# Patient Record
Sex: FEMALE | Race: WHITE | Employment: OTHER | URBAN - METROPOLITAN AREA
[De-identification: names, ages, dates, MRNs, and addresses within clinical notes are randomized per-mention and may not be internally consistent; named-entity substitution may affect disease eponyms.]

---

## 2018-02-26 ENCOUNTER — TELEPHONE (OUTPATIENT)
Dept: INTERNAL MEDICINE CLINIC | Age: 68
End: 2018-02-26

## 2018-04-02 ENCOUNTER — OFFICE VISIT (OUTPATIENT)
Dept: INTERNAL MEDICINE CLINIC | Age: 68
End: 2018-04-02

## 2018-04-02 VITALS
DIASTOLIC BLOOD PRESSURE: 94 MMHG | HEART RATE: 80 BPM | HEIGHT: 67 IN | BODY MASS INDEX: 35.69 KG/M2 | SYSTOLIC BLOOD PRESSURE: 144 MMHG | WEIGHT: 227.4 LBS

## 2018-04-02 DIAGNOSIS — C91.10 CLL (CHRONIC LYMPHOCYTIC LEUKEMIA) (HCC): Primary | ICD-10-CM

## 2018-04-02 DIAGNOSIS — Z13.220 SCREENING CHOLESTEROL LEVEL: ICD-10-CM

## 2018-04-02 DIAGNOSIS — R63.5 WEIGHT GAIN: ICD-10-CM

## 2018-04-02 DIAGNOSIS — E66.9 OBESITY (BMI 35.0-39.9 WITHOUT COMORBIDITY): ICD-10-CM

## 2018-04-02 DIAGNOSIS — E55.9 VITAMIN D INSUFFICIENCY: ICD-10-CM

## 2018-04-02 DIAGNOSIS — I10 ESSENTIAL HYPERTENSION: ICD-10-CM

## 2018-04-02 PROCEDURE — 99214 OFFICE O/P EST MOD 30 MIN: CPT | Performed by: INTERNAL MEDICINE

## 2018-04-02 RX ORDER — AMLODIPINE BESYLATE 5 MG/1
5 TABLET ORAL DAILY
Qty: 30 TABLET | Refills: 3 | Status: SHIPPED | OUTPATIENT
Start: 2018-04-02 | End: 2019-02-24 | Stop reason: SDUPTHER

## 2018-04-02 ASSESSMENT — ENCOUNTER SYMPTOMS
CONSTIPATION: 0
DIARRHEA: 0
CHEST TIGHTNESS: 0
SHORTNESS OF BREATH: 0

## 2018-10-02 ENCOUNTER — OFFICE VISIT (OUTPATIENT)
Dept: INTERNAL MEDICINE CLINIC | Age: 68
End: 2018-10-02
Payer: MEDICARE

## 2018-10-02 VITALS
DIASTOLIC BLOOD PRESSURE: 78 MMHG | RESPIRATION RATE: 12 BRPM | SYSTOLIC BLOOD PRESSURE: 120 MMHG | HEART RATE: 84 BPM | BODY MASS INDEX: 36.09 KG/M2 | WEIGHT: 230.4 LBS

## 2018-10-02 DIAGNOSIS — Z71.85 VACCINE COUNSELING: ICD-10-CM

## 2018-10-02 DIAGNOSIS — E55.9 VITAMIN D INSUFFICIENCY: ICD-10-CM

## 2018-10-02 DIAGNOSIS — C91.10 CLL (CHRONIC LYMPHOCYTIC LEUKEMIA) (HCC): ICD-10-CM

## 2018-10-02 DIAGNOSIS — Z13.220 SCREENING CHOLESTEROL LEVEL: ICD-10-CM

## 2018-10-02 DIAGNOSIS — H61.21 IMPACTED CERUMEN, RIGHT EAR: ICD-10-CM

## 2018-10-02 DIAGNOSIS — Z12.39 BREAST CANCER SCREENING: ICD-10-CM

## 2018-10-02 DIAGNOSIS — I10 ESSENTIAL HYPERTENSION: Primary | ICD-10-CM

## 2018-10-02 DIAGNOSIS — Z12.11 COLON CANCER SCREENING: ICD-10-CM

## 2018-10-02 DIAGNOSIS — R63.5 WEIGHT GAIN: ICD-10-CM

## 2018-10-02 DIAGNOSIS — I10 ESSENTIAL HYPERTENSION: ICD-10-CM

## 2018-10-02 LAB
CHOLESTEROL, TOTAL: 319 MG/DL (ref 0–199)
HDLC SERPL-MCNC: 65 MG/DL (ref 40–60)
LDL CHOLESTEROL CALCULATED: 209 MG/DL
TRIGL SERPL-MCNC: 226 MG/DL (ref 0–150)
TSH REFLEX: 1.87 UIU/ML (ref 0.27–4.2)
VITAMIN D 25-HYDROXY: 15.4 NG/ML
VLDLC SERPL CALC-MCNC: 45 MG/DL

## 2018-10-02 PROCEDURE — G8427 DOCREV CUR MEDS BY ELIG CLIN: HCPCS | Performed by: INTERNAL MEDICINE

## 2018-10-02 PROCEDURE — 1123F ACP DISCUSS/DSCN MKR DOCD: CPT | Performed by: INTERNAL MEDICINE

## 2018-10-02 PROCEDURE — 1101F PT FALLS ASSESS-DOCD LE1/YR: CPT | Performed by: INTERNAL MEDICINE

## 2018-10-02 PROCEDURE — 1090F PRES/ABSN URINE INCON ASSESS: CPT | Performed by: INTERNAL MEDICINE

## 2018-10-02 PROCEDURE — 1036F TOBACCO NON-USER: CPT | Performed by: INTERNAL MEDICINE

## 2018-10-02 PROCEDURE — G8417 CALC BMI ABV UP PARAM F/U: HCPCS | Performed by: INTERNAL MEDICINE

## 2018-10-02 PROCEDURE — G8484 FLU IMMUNIZE NO ADMIN: HCPCS | Performed by: INTERNAL MEDICINE

## 2018-10-02 PROCEDURE — 99214 OFFICE O/P EST MOD 30 MIN: CPT | Performed by: INTERNAL MEDICINE

## 2018-10-02 PROCEDURE — G8400 PT W/DXA NO RESULTS DOC: HCPCS | Performed by: INTERNAL MEDICINE

## 2018-10-02 PROCEDURE — 4040F PNEUMOC VAC/ADMIN/RCVD: CPT | Performed by: INTERNAL MEDICINE

## 2018-10-02 PROCEDURE — 3017F COLORECTAL CA SCREEN DOC REV: CPT | Performed by: INTERNAL MEDICINE

## 2018-10-02 ASSESSMENT — ENCOUNTER SYMPTOMS
DIARRHEA: 0
CHEST TIGHTNESS: 0
SHORTNESS OF BREATH: 0
CONSTIPATION: 0

## 2018-10-02 ASSESSMENT — PATIENT HEALTH QUESTIONNAIRE - PHQ9
SUM OF ALL RESPONSES TO PHQ QUESTIONS 1-9: 0
2. FEELING DOWN, DEPRESSED OR HOPELESS: 0
1. LITTLE INTEREST OR PLEASURE IN DOING THINGS: 0
SUM OF ALL RESPONSES TO PHQ9 QUESTIONS 1 & 2: 0
SUM OF ALL RESPONSES TO PHQ QUESTIONS 1-9: 0

## 2018-10-22 ENCOUNTER — OFFICE VISIT (OUTPATIENT)
Dept: INTERNAL MEDICINE CLINIC | Age: 68
End: 2018-10-22
Payer: MEDICARE

## 2018-10-22 VITALS
WEIGHT: 228.8 LBS | HEART RATE: 78 BPM | DIASTOLIC BLOOD PRESSURE: 80 MMHG | SYSTOLIC BLOOD PRESSURE: 124 MMHG | RESPIRATION RATE: 12 BRPM | BODY MASS INDEX: 35.84 KG/M2 | TEMPERATURE: 98.6 F

## 2018-10-22 DIAGNOSIS — J30.2 SEASONAL ALLERGIES: ICD-10-CM

## 2018-10-22 DIAGNOSIS — H61.21 IMPACTED CERUMEN, RIGHT EAR: Primary | ICD-10-CM

## 2018-10-22 PROCEDURE — 3017F COLORECTAL CA SCREEN DOC REV: CPT | Performed by: INTERNAL MEDICINE

## 2018-10-22 PROCEDURE — 99213 OFFICE O/P EST LOW 20 MIN: CPT | Performed by: INTERNAL MEDICINE

## 2018-10-22 PROCEDURE — 1101F PT FALLS ASSESS-DOCD LE1/YR: CPT | Performed by: INTERNAL MEDICINE

## 2018-10-22 PROCEDURE — G8417 CALC BMI ABV UP PARAM F/U: HCPCS | Performed by: INTERNAL MEDICINE

## 2018-10-22 PROCEDURE — 4040F PNEUMOC VAC/ADMIN/RCVD: CPT | Performed by: INTERNAL MEDICINE

## 2018-10-22 PROCEDURE — G8400 PT W/DXA NO RESULTS DOC: HCPCS | Performed by: INTERNAL MEDICINE

## 2018-10-22 PROCEDURE — G8427 DOCREV CUR MEDS BY ELIG CLIN: HCPCS | Performed by: INTERNAL MEDICINE

## 2018-10-22 PROCEDURE — 1123F ACP DISCUSS/DSCN MKR DOCD: CPT | Performed by: INTERNAL MEDICINE

## 2018-10-22 PROCEDURE — 1090F PRES/ABSN URINE INCON ASSESS: CPT | Performed by: INTERNAL MEDICINE

## 2018-10-22 PROCEDURE — 1036F TOBACCO NON-USER: CPT | Performed by: INTERNAL MEDICINE

## 2018-10-22 PROCEDURE — G8484 FLU IMMUNIZE NO ADMIN: HCPCS | Performed by: INTERNAL MEDICINE

## 2018-10-22 RX ORDER — FEXOFENADINE HCL 180 MG/1
180 TABLET ORAL DAILY
Qty: 30 TABLET | Refills: 5 | Status: SHIPPED | OUTPATIENT
Start: 2018-10-22 | End: 2019-07-01 | Stop reason: ALTCHOICE

## 2018-10-22 ASSESSMENT — ENCOUNTER SYMPTOMS
EYE ITCHING: 0
SHORTNESS OF BREATH: 0
NAUSEA: 0
WHEEZING: 0
TROUBLE SWALLOWING: 0
RHINORRHEA: 0
VOMITING: 0
SINUS PAIN: 0
SINUS PRESSURE: 0
EYE DISCHARGE: 0
SORE THROAT: 0
VOICE CHANGE: 0
CHEST TIGHTNESS: 0
DIARRHEA: 0
COUGH: 0

## 2018-10-22 NOTE — PROGRESS NOTES
Upper Arm)   Pulse 78   Temp 98.6 °F (37 °C) (Oral)   Resp 12   Wt 228 lb 12.8 oz (103.8 kg)   BMI 35.84 kg/m²   Physical Exam   Constitutional: She appears well-developed and well-nourished. She is cooperative. She appears ill. No distress. HENT:   Left Ear: No tenderness. Tympanic membrane is not injected, not retracted and not bulging. No middle ear effusion. Nose: Mucosal edema and rhinorrhea present. Right sinus exhibits no maxillary sinus tenderness and no frontal sinus tenderness. Left sinus exhibits no maxillary sinus tenderness and no frontal sinus tenderness. Mouth/Throat: No oropharyngeal exudate, posterior oropharyngeal edema or posterior oropharyngeal erythema. Right ear with impacted cerumen. Cardiovascular: Normal rate, regular rhythm and normal heart sounds. Pulmonary/Chest: Effort normal and breath sounds normal. She has no wheezes. Lymphadenopathy:     She has no cervical adenopathy. Neurological: She is alert. ASSESSMENT/PLAN:    Problem List Items Addressed This Visit     Impacted cerumen, right ear - Primary     Right ear irrigated successfully. Recommend continued use of Debrox earwax drops but twice weekly to avoid further wax buildup. Seasonal allergies     I do not think she is a current infection but that her allergies have flared. Recommend Allegra 180 mg q. day. If her symptoms should worsen and she should become febrile or develops purulent sputum, she will call back. She does have CLL so has limited ability to fight infection on her own.          Relevant Medications    fexofenadine (ALLEGRA ALLERGY) 180 MG tablet          Current Outpatient Medications   Medication Sig Dispense Refill    fexofenadine (ALLEGRA ALLERGY) 180 MG tablet Take 1 tablet by mouth daily 30 tablet 5    IMBRUVICA 140 MG chemo capsule       Ascorbic Acid (VITAMIN C) 500 MG tablet Take 500 mg by mouth daily      amLODIPine (NORVASC) 5 MG tablet TAKE 1 TABLET BY MOUTH ONE

## 2018-10-22 NOTE — ASSESSMENT & PLAN NOTE
I do not think she is a current infection but that her allergies have flared. Recommend Allegra 180 mg q. day. If her symptoms should worsen and she should become febrile or develops purulent sputum, she will call back. She does have CLL so has limited ability to fight infection on her own.

## 2018-10-23 ENCOUNTER — TELEPHONE (OUTPATIENT)
Dept: INTERNAL MEDICINE CLINIC | Age: 68
End: 2018-10-23

## 2018-10-23 RX ORDER — AZITHROMYCIN 250 MG/1
TABLET, FILM COATED ORAL
Qty: 6 TABLET | Refills: 0 | Status: SHIPPED | OUTPATIENT
Start: 2018-10-23 | End: 2019-04-01 | Stop reason: ALTCHOICE

## 2018-10-23 NOTE — TELEPHONE ENCOUNTER
Patient states  told her to call back if she didn't feel any better. She says the congestion in her head and chest is worse. She doesn't have a fever. She just feels weak. Denies any colored mucus. Please call and advise. Pharmacy and allergies were verified.

## 2018-11-01 PROBLEM — Z12.11 COLON CANCER SCREENING: Status: RESOLVED | Noted: 2018-10-02 | Resolved: 2018-11-01

## 2018-11-01 PROBLEM — Z12.39 BREAST CANCER SCREENING: Status: RESOLVED | Noted: 2018-10-02 | Resolved: 2018-11-01

## 2019-02-24 DIAGNOSIS — I10 ESSENTIAL HYPERTENSION: ICD-10-CM

## 2019-02-25 RX ORDER — AMLODIPINE BESYLATE 5 MG/1
TABLET ORAL
Qty: 30 TABLET | Refills: 2 | Status: ON HOLD | OUTPATIENT
Start: 2019-02-25 | End: 2019-05-26 | Stop reason: SDUPTHER

## 2019-04-01 ENCOUNTER — OFFICE VISIT (OUTPATIENT)
Dept: INTERNAL MEDICINE CLINIC | Age: 69
End: 2019-04-01
Payer: MEDICARE

## 2019-04-01 VITALS
BODY MASS INDEX: 35.74 KG/M2 | HEART RATE: 80 BPM | SYSTOLIC BLOOD PRESSURE: 140 MMHG | WEIGHT: 228.2 LBS | DIASTOLIC BLOOD PRESSURE: 98 MMHG | OXYGEN SATURATION: 98 %

## 2019-04-01 DIAGNOSIS — E55.9 VITAMIN D INSUFFICIENCY: ICD-10-CM

## 2019-04-01 DIAGNOSIS — R07.89 ATYPICAL CHEST PAIN: ICD-10-CM

## 2019-04-01 DIAGNOSIS — C91.10 CLL (CHRONIC LYMPHOCYTIC LEUKEMIA) (HCC): ICD-10-CM

## 2019-04-01 DIAGNOSIS — R73.9 HYPERGLYCEMIA: ICD-10-CM

## 2019-04-01 DIAGNOSIS — I10 ESSENTIAL HYPERTENSION: ICD-10-CM

## 2019-04-01 DIAGNOSIS — E55.9 VITAMIN D INSUFFICIENCY: Primary | ICD-10-CM

## 2019-04-01 DIAGNOSIS — E78.5 HYPERLIPIDEMIA LDL GOAL <100: ICD-10-CM

## 2019-04-01 LAB
A/G RATIO: 2.2 (ref 1.1–2.2)
ALBUMIN SERPL-MCNC: 4.1 G/DL (ref 3.4–5)
ALP BLD-CCNC: 77 U/L (ref 40–129)
ALT SERPL-CCNC: 17 U/L (ref 10–40)
ANION GAP SERPL CALCULATED.3IONS-SCNC: 13 MMOL/L (ref 3–16)
AST SERPL-CCNC: 17 U/L (ref 15–37)
BILIRUB SERPL-MCNC: <0.2 MG/DL (ref 0–1)
BUN BLDV-MCNC: 23 MG/DL (ref 7–20)
CALCIUM SERPL-MCNC: 9.1 MG/DL (ref 8.3–10.6)
CHLORIDE BLD-SCNC: 107 MMOL/L (ref 99–110)
CO2: 24 MMOL/L (ref 21–32)
CREAT SERPL-MCNC: 0.9 MG/DL (ref 0.6–1.2)
GFR AFRICAN AMERICAN: >60
GFR NON-AFRICAN AMERICAN: >60
GLOBULIN: 1.9 G/DL
GLUCOSE BLD-MCNC: 92 MG/DL (ref 70–99)
POTASSIUM SERPL-SCNC: 4.4 MMOL/L (ref 3.5–5.1)
SODIUM BLD-SCNC: 144 MMOL/L (ref 136–145)
TOTAL PROTEIN: 6 G/DL (ref 6.4–8.2)

## 2019-04-01 PROCEDURE — 99214 OFFICE O/P EST MOD 30 MIN: CPT | Performed by: INTERNAL MEDICINE

## 2019-04-01 PROCEDURE — 4040F PNEUMOC VAC/ADMIN/RCVD: CPT | Performed by: INTERNAL MEDICINE

## 2019-04-01 PROCEDURE — 3017F COLORECTAL CA SCREEN DOC REV: CPT | Performed by: INTERNAL MEDICINE

## 2019-04-01 PROCEDURE — G8427 DOCREV CUR MEDS BY ELIG CLIN: HCPCS | Performed by: INTERNAL MEDICINE

## 2019-04-01 PROCEDURE — G8400 PT W/DXA NO RESULTS DOC: HCPCS | Performed by: INTERNAL MEDICINE

## 2019-04-01 PROCEDURE — G8417 CALC BMI ABV UP PARAM F/U: HCPCS | Performed by: INTERNAL MEDICINE

## 2019-04-01 PROCEDURE — 1123F ACP DISCUSS/DSCN MKR DOCD: CPT | Performed by: INTERNAL MEDICINE

## 2019-04-01 PROCEDURE — 1036F TOBACCO NON-USER: CPT | Performed by: INTERNAL MEDICINE

## 2019-04-01 PROCEDURE — 1090F PRES/ABSN URINE INCON ASSESS: CPT | Performed by: INTERNAL MEDICINE

## 2019-04-01 RX ORDER — ERGOCALCIFEROL 1.25 MG/1
50000 CAPSULE ORAL WEEKLY
Qty: 12 CAPSULE | Refills: 1 | Status: SHIPPED | OUTPATIENT
Start: 2019-04-01 | End: 2019-07-01 | Stop reason: SDUPTHER

## 2019-04-01 RX ORDER — ROSUVASTATIN CALCIUM 10 MG/1
10 TABLET, COATED ORAL DAILY
Qty: 30 TABLET | Refills: 5 | Status: SHIPPED | OUTPATIENT
Start: 2019-04-01 | End: 2019-07-01 | Stop reason: SDUPTHER

## 2019-04-01 ASSESSMENT — ENCOUNTER SYMPTOMS
DIARRHEA: 0
ABDOMINAL PAIN: 0
CONSTIPATION: 0
SHORTNESS OF BREATH: 0
NAUSEA: 0
CHEST TIGHTNESS: 0

## 2019-04-01 ASSESSMENT — PATIENT HEALTH QUESTIONNAIRE - PHQ9
SUM OF ALL RESPONSES TO PHQ QUESTIONS 1-9: 0
2. FEELING DOWN, DEPRESSED OR HOPELESS: 0
1. LITTLE INTEREST OR PLEASURE IN DOING THINGS: 0
SUM OF ALL RESPONSES TO PHQ QUESTIONS 1-9: 0
SUM OF ALL RESPONSES TO PHQ9 QUESTIONS 1 & 2: 0

## 2019-04-01 NOTE — PROGRESS NOTES
Patient: Ralph Castorena is a 77 y.o. female who presents today with the following Chief Complaint(s):  Chief Complaint   Patient presents with    6 Month Follow-Up       Here today for follow up. She is working on her diet, still trying to lose weight. Is eating more turkey, brown rice, vegetables. Feels better since she has made these changes. Is eating more spices and not watching her salt. Her blood pressure has been elevated intermittently. Diastolic has been elevated more so than systolic. Has not been watching her salt. No edema. Is worried that part of the problem is her fatty liver. Denies any depression but does feel melancholia at times over aging, losing her parents. Remains on Imburvica for CLL. Follows with Dr. Marzena Newberry. Is concerned about her liver as a result of the Imbruvica.     Lab Results       Component                Value               Date                       CHOL                     319 (H)             10/02/2018            Lab Results       Component                Value               Date                       TRIG                     226 (H)             10/02/2018            Lab Results       Component                Value               Date                       HDL                      65 (H)              10/02/2018            Lab Results       Component                Value               Date                       LDLCALC                  209 (H)             10/02/2018            Lab Results       Component                Value               Date                       LABVLDL                  45                  10/02/2018            No results found for: [facility]  Lab Results       Component                Value               Date                       NA                       141                 02/13/2018                 K                        4.0                 02/13/2018                 CL                       107                 02/13/2018 CO2                      23                  02/13/2018                 BUN                      23 (H)              02/13/2018                 CREATININE               1.1                 02/13/2018                 GLUCOSE                  104 (H)             02/13/2018                 CALCIUM                  8.2 (L)             02/13/2018                 PROT                     5.7 (L)             02/13/2018                 LABALBU                  3.7                 02/13/2018                 BILITOT                  <0.2                02/13/2018                 ALKPHOS                  67                  02/13/2018                 AST                      18                  02/13/2018                 ALT                      18                  02/13/2018                 LABGLOM                  49 (A)              02/13/2018                 GFRAA                    60 (A)              02/13/2018                 AGRATIO                  1.9                 02/13/2018                 GLOB                     2.0                 02/13/2018              Vit D, 25-Hydroxy 15.4  >=30 ng/mL Final 10/02/2018 10:22 AM [facility]   TSH 1.87  0.27 - 4.20 uIU/mL Final 10/02/2018 10:22 AM [facility]     The 10-year ASCVD risk score (Angelique Clinton, et al., 2013) is: 16.5%    Values used to calculate the score:      Age: 77 years      Sex: Female      Is Non- : No      Diabetic: No      Tobacco smoker: No      Systolic Blood Pressure: 388 mmHg      Is BP treated: Yes      HDL Cholesterol: 65 mg/dL      Total Cholesterol: 319 mg/dL    Does have a lot of aches and pains. Will occasionally get a dull pain in her chest, usually at rest, better with deep breathing. No SOB. Does walk and never gets pain with walking or climbing stairs. Does sometimes get heart burn. Last episode happened after she ate peanut butter.        Allergies   Allergen Reactions    Amoxicillin     Lisinopril Other (See Comments)     States it caused depression     Molds & Smuts     Penicillins       Past Medical History:   Diagnosis Date    Anemia     [facility] [address]     Hernia     Hypertension     Leukemia ([facility] [address]       Past Surgical History:   Procedure Laterality Date    HYSTERECTOMY      OVARY REMOVAL      VARICOSE VEIN SURGERY        Social History     Socioeconomic History    Marital status: Single     Spouse name: Not on file    Number of children: Not on file    Years of education: Not on file    Highest education level: Not on file   Occupational History    Not on file   Social Needs    Financial resource strain: Not on file    Food insecurity:     Worry: Not on file     Inability: Not on file    Transportation needs:     Medical: Not on file     Non-medical: Not on file   Tobacco Use    Smoking status: Former Smoker     Packs/day: 0.05     Years: 0.00     Pack years: 0.00     Types: Cigarettes     Start date: 1968     Last attempt to quit: 2016     Years since quitting: 3.2    Smokeless tobacco: Never Used    Tobacco comment: less than 1 pack per week at end; never over 1ppd   Substance and Sexual Activity    Alcohol use: No    Drug use: No    Sexual activity: Not on file   Lifestyle    Physical activity:     Days per week: Not on file     Minutes per session: Not on file    Stress: Not on file   Relationships    Social connections:     Talks on phone: Not on file     Gets together: Not on file     Attends [employer] service: Not on file     Active member of club or organization: Not on file     Attends meetings of clubs or organizations: Not on file     Relationship status: Not on file    Intimate partner violence:     Fear of current or ex partner: Not on file     Emotionally abused: Not on file     Physically abused: Not on file     Forced sexual activity: Not on file   Other Topics Concern    Not on file   Social History Narrative    Not on thyromegaly present. Cardiovascular: Normal rate, regular rhythm, normal heart sounds and intact distal pulses. No murmur heard. Pulses:       Dorsalis pedis pulses are 2+ on the right side, and 2+ on the left side. No LE edema   Pulmonary/Chest: Effort normal. She has no wheezes. She has no rales. She exhibits no tenderness. Abdominal: Soft. Bowel sounds are normal. She exhibits no mass. There is no tenderness. Lymphadenopathy:     She has no cervical adenopathy. Neurological: She is alert and oriented to person, place, and time. Skin: Skin is warm and dry. No pallor. Psychiatric: She has a normal mood and affect. Her behavior is normal. Judgment and thought content normal.       ASSESSMENT/PLAN:    Problem List Items Addressed This Visit     CLL (chronic lymphocytic leukemia) ([facility] [address]     Remains on Imbruvica. Continues to follow with Dr. Gisell Ramirez. Essential hypertension     Elevated diastolic blood pressure. Discussed cutting back on salt with goal of under 2 g of sodium per day. Continue to monitor at home. Continue amlodipine 5 mg q. day. If diastolic blood pressure remains elevated, will add hydrochlorothiazide at return visit. Relevant Medications    rosuvastatin (CRESTOR) 10 MG tablet    Other Relevant Orders    COMPREHENSIVE METABOLIC PANEL    Comprehensive Metabolic Panel    Vitamin D insufficiency - Primary     Add weekly ergocalciferol. Relevant Medications    vitamin D (ERGOCALCIFEROL) 41259 units CAPS capsule    Other Relevant Orders    COMPREHENSIVE METABOLIC PANEL    Vitamin D 25 Hydroxy    Hyperlipidemia LDL goal <100     Start Crestor 10 mg q. day in June, 2 months after starting weekly ergocalciferol. Check CMP 1 month (July) after starting ergocalciferol. Relevant Medications    rosuvastatin (CRESTOR) 10 MG tablet    Other Relevant Orders    Comprehensive Metabolic Panel    Hyperglycemia     Check hemoglobin A1c.   Glucose of 104 last February. Relevant Orders    HEMOGLOBIN A1C    Atypical chest pain     Noncardiac by history. Reviewed signs symptoms of coronary artery disease. Reviewed to go to the emergency department should she have chest pain that is accompanied by shortness of breath or chest pain the last more than 10-15 minutes. Chest pain that she is describing is likely GERD in etiology. Current Outpatient Medications   Medication Sig Dispense Refill    vitamin D (ERGOCALCIFEROL) 66713 units CAPS capsule Take 1 capsule by mouth once a week 12 capsule 1    rosuvastatin (CRESTOR) 10 MG tablet Take 1 tablet by mouth daily 30 tablet 5    amLODIPine (NORVASC) 5 MG tablet TAKE 1 TABLET BY MOUTH ONE TIME A DAY  30 tablet 2    fexofenadine (ALLEGRA ALLERGY) 180 MG tablet Take 1 tablet by mouth daily 30 tablet 5    IMBRUVICA 140 MG chemo capsule       Ascorbic Acid (VITAMIN C) 500 MG tablet Take 500 mg by mouth daily       No current facility-administered medications for this visit. Return in about 3 months (around 7/1/2019) for labs prior.

## 2019-04-01 NOTE — ASSESSMENT & PLAN NOTE
Noncardiac by history. Reviewed signs symptoms of coronary artery disease. Reviewed to go to the emergency department should she have chest pain that is accompanied by shortness of breath or chest pain the last more than 10-15 minutes. Chest pain that she is describing is likely GERD in etiology.

## 2019-04-01 NOTE — ASSESSMENT & PLAN NOTE
Start Crestor 10 mg q. day in June, 2 months after starting weekly ergocalciferol. Check CMP 1 month (July) after starting ergocalciferol.

## 2019-04-01 NOTE — PATIENT INSTRUCTIONS
Start Ergocalciferol 50,000 units weekly to replace vitamin D. Stay on this but after you have been taking it for 2 months (in June), please add Crestor (rosuvastatin) 10 mg 1 daily to help lower your cholesterol. Please try to reduce the salt in your diet.

## 2019-04-02 LAB
ESTIMATED AVERAGE GLUCOSE: 108.3 MG/DL
HBA1C MFR BLD: 5.4 %

## 2019-05-27 ENCOUNTER — HOSPITAL ENCOUNTER (INPATIENT)
Age: 69
LOS: 2 days | Discharge: HOME | DRG: 260 | End: 2019-05-29
Attending: EMERGENCY MEDICINE | Admitting: INTERNAL MEDICINE
Payer: MEDICARE

## 2019-05-27 ENCOUNTER — APPOINTMENT (OUTPATIENT)
Dept: GENERAL RADIOLOGY | Age: 69
DRG: 260 | End: 2019-05-27
Payer: MEDICARE

## 2019-05-27 DIAGNOSIS — I48.91 ATRIAL FIBRILLATION WITH RVR (HCC): ICD-10-CM

## 2019-05-27 DIAGNOSIS — J18.9 PNEUMONIA OF RIGHT LOWER LOBE DUE TO INFECTIOUS ORGANISM: Primary | ICD-10-CM

## 2019-05-27 DIAGNOSIS — Z85.6 PERSONAL HISTORY OF CLL (CHRONIC LYMPHOCYTIC LEUKEMIA): ICD-10-CM

## 2019-05-27 LAB
A/G RATIO: 1.3 (ref 1.1–2.2)
ALBUMIN SERPL-MCNC: 3.6 G/DL (ref 3.4–5)
ALP BLD-CCNC: 135 U/L (ref 40–129)
ALT SERPL-CCNC: 16 U/L (ref 10–40)
ANION GAP SERPL CALCULATED.3IONS-SCNC: 14 MMOL/L (ref 3–16)
AST SERPL-CCNC: 13 U/L (ref 15–37)
BASOPHILS ABSOLUTE: 0.1 K/UL (ref 0–0.2)
BASOPHILS RELATIVE PERCENT: 0.8 %
BILIRUB SERPL-MCNC: 0.4 MG/DL (ref 0–1)
BUN BLDV-MCNC: 23 MG/DL (ref 7–20)
CALCIUM SERPL-MCNC: 9.2 MG/DL (ref 8.3–10.6)
CHLORIDE BLD-SCNC: 103 MMOL/L (ref 99–110)
CO2: 23 MMOL/L (ref 21–32)
CREAT SERPL-MCNC: 1.1 MG/DL (ref 0.6–1.2)
EOSINOPHILS ABSOLUTE: 0 K/UL (ref 0–0.6)
EOSINOPHILS RELATIVE PERCENT: 0.3 %
GFR AFRICAN AMERICAN: 59
GFR NON-AFRICAN AMERICAN: 49
GLOBULIN: 2.7 G/DL
GLUCOSE BLD-MCNC: 106 MG/DL (ref 70–99)
HCT VFR BLD CALC: 37.1 % (ref 36–48)
HEMOGLOBIN: 12.1 G/DL (ref 12–16)
IGA: 50 MG/DL (ref 70–400)
IGG: 300 MG/DL (ref 700–1600)
IGM: 35 MG/DL (ref 40–230)
INR BLD: 1.18 (ref 0.86–1.14)
LACTIC ACID, SEPSIS: 0.6 MMOL/L (ref 0.4–1.9)
LYMPHOCYTES ABSOLUTE: 0.9 K/UL (ref 1–5.1)
LYMPHOCYTES RELATIVE PERCENT: 7.3 %
MCH RBC QN AUTO: 27.9 PG (ref 26–34)
MCHC RBC AUTO-ENTMCNC: 32.7 G/DL (ref 31–36)
MCV RBC AUTO: 85.4 FL (ref 80–100)
MONOCYTES ABSOLUTE: 1.3 K/UL (ref 0–1.3)
MONOCYTES RELATIVE PERCENT: 10.8 %
NEUTROPHILS ABSOLUTE: 9.5 K/UL (ref 1.7–7.7)
NEUTROPHILS RELATIVE PERCENT: 80.8 %
PDW BLD-RTO: 15 % (ref 12.4–15.4)
PLATELET # BLD: 272 K/UL (ref 135–450)
PMV BLD AUTO: 9.9 FL (ref 5–10.5)
POTASSIUM REFLEX MAGNESIUM: 4.1 MMOL/L (ref 3.5–5.1)
PROCALCITONIN: 0.12 NG/ML (ref 0–0.15)
PROTHROMBIN TIME: 13.5 SEC (ref 9.8–13)
RBC # BLD: 4.34 M/UL (ref 4–5.2)
SODIUM BLD-SCNC: 140 MMOL/L (ref 136–145)
TOTAL PROTEIN: 6.3 G/DL (ref 6.4–8.2)
TSH SERPL DL<=0.05 MIU/L-ACNC: 1.72 UIU/ML (ref 0.27–4.2)
WBC # BLD: 11.8 K/UL (ref 4–11)

## 2019-05-27 PROCEDURE — 87040 BLOOD CULTURE FOR BACTERIA: CPT

## 2019-05-27 PROCEDURE — 6360000002 HC RX W HCPCS: Performed by: INTERNAL MEDICINE

## 2019-05-27 PROCEDURE — 36415 COLL VENOUS BLD VENIPUNCTURE: CPT

## 2019-05-27 PROCEDURE — 71046 X-RAY EXAM CHEST 2 VIEWS: CPT

## 2019-05-27 PROCEDURE — 84145 PROCALCITONIN (PCT): CPT

## 2019-05-27 PROCEDURE — 85025 COMPLETE CBC W/AUTO DIFF WBC: CPT

## 2019-05-27 PROCEDURE — 2500000003 HC RX 250 WO HCPCS: Performed by: PHYSICIAN ASSISTANT

## 2019-05-27 PROCEDURE — 80053 COMPREHEN METABOLIC PANEL: CPT

## 2019-05-27 PROCEDURE — 87385 HISTOPLASMA CAPSUL AG IA: CPT

## 2019-05-27 PROCEDURE — 2580000003 HC RX 258: Performed by: PHYSICIAN ASSISTANT

## 2019-05-27 PROCEDURE — 1200000000 HC SEMI PRIVATE

## 2019-05-27 PROCEDURE — 6370000000 HC RX 637 (ALT 250 FOR IP): Performed by: INTERNAL MEDICINE

## 2019-05-27 PROCEDURE — 99284 EMERGENCY DEPT VISIT MOD MDM: CPT

## 2019-05-27 PROCEDURE — 84443 ASSAY THYROID STIM HORMONE: CPT

## 2019-05-27 PROCEDURE — 96374 THER/PROPH/DIAG INJ IV PUSH: CPT

## 2019-05-27 PROCEDURE — 93005 ELECTROCARDIOGRAM TRACING: CPT | Performed by: PHYSICIAN ASSISTANT

## 2019-05-27 PROCEDURE — 87449 NOS EACH ORGANISM AG IA: CPT

## 2019-05-27 PROCEDURE — 85610 PROTHROMBIN TIME: CPT

## 2019-05-27 PROCEDURE — 6360000002 HC RX W HCPCS: Performed by: PHYSICIAN ASSISTANT

## 2019-05-27 PROCEDURE — 96361 HYDRATE IV INFUSION ADD-ON: CPT

## 2019-05-27 PROCEDURE — 83605 ASSAY OF LACTIC ACID: CPT

## 2019-05-27 PROCEDURE — 2580000003 HC RX 258: Performed by: INTERNAL MEDICINE

## 2019-05-27 PROCEDURE — 82784 ASSAY IGA/IGD/IGG/IGM EACH: CPT

## 2019-05-27 RX ORDER — ASCORBIC ACID 500 MG
500 TABLET ORAL DAILY
Status: DISCONTINUED | OUTPATIENT
Start: 2019-05-27 | End: 2019-05-29 | Stop reason: HOSPADM

## 2019-05-27 RX ORDER — SODIUM CHLORIDE 0.9 % (FLUSH) 0.9 %
10 SYRINGE (ML) INJECTION EVERY 12 HOURS SCHEDULED
Status: DISCONTINUED | OUTPATIENT
Start: 2019-05-27 | End: 2019-05-29 | Stop reason: HOSPADM

## 2019-05-27 RX ORDER — SODIUM CHLORIDE 0.9 % (FLUSH) 0.9 %
10 SYRINGE (ML) INJECTION PRN
Status: DISCONTINUED | OUTPATIENT
Start: 2019-05-27 | End: 2019-05-29 | Stop reason: HOSPADM

## 2019-05-27 RX ORDER — 0.9 % SODIUM CHLORIDE 0.9 %
1000 INTRAVENOUS SOLUTION INTRAVENOUS ONCE
Status: COMPLETED | OUTPATIENT
Start: 2019-05-27 | End: 2019-05-27

## 2019-05-27 RX ORDER — ACETAMINOPHEN 325 MG/1
650 TABLET ORAL EVERY 4 HOURS PRN
Status: DISCONTINUED | OUTPATIENT
Start: 2019-05-27 | End: 2019-05-29 | Stop reason: HOSPADM

## 2019-05-27 RX ORDER — SODIUM CHLORIDE 9 MG/ML
INJECTION, SOLUTION INTRAVENOUS CONTINUOUS
Status: DISCONTINUED | OUTPATIENT
Start: 2019-05-27 | End: 2019-05-29 | Stop reason: HOSPADM

## 2019-05-27 RX ORDER — DILTIAZEM HYDROCHLORIDE 5 MG/ML
10 INJECTION INTRAVENOUS ONCE
Status: COMPLETED | OUTPATIENT
Start: 2019-05-27 | End: 2019-05-27

## 2019-05-27 RX ORDER — ROSUVASTATIN CALCIUM 10 MG/1
10 TABLET, COATED ORAL NIGHTLY
Status: DISCONTINUED | OUTPATIENT
Start: 2019-05-27 | End: 2019-05-29 | Stop reason: HOSPADM

## 2019-05-27 RX ORDER — ONDANSETRON 2 MG/ML
4 INJECTION INTRAMUSCULAR; INTRAVENOUS EVERY 6 HOURS PRN
Status: DISCONTINUED | OUTPATIENT
Start: 2019-05-27 | End: 2019-05-29 | Stop reason: HOSPADM

## 2019-05-27 RX ORDER — ALBUTEROL SULFATE 2.5 MG/3ML
2.5 SOLUTION RESPIRATORY (INHALATION)
Status: DISCONTINUED | OUTPATIENT
Start: 2019-05-27 | End: 2019-05-29 | Stop reason: HOSPADM

## 2019-05-27 RX ADMIN — AZITHROMYCIN MONOHYDRATE 500 MG: 500 INJECTION, POWDER, LYOPHILIZED, FOR SOLUTION INTRAVENOUS at 15:51

## 2019-05-27 RX ADMIN — SODIUM CHLORIDE: 9 INJECTION, SOLUTION INTRAVENOUS at 12:37

## 2019-05-27 RX ADMIN — CEFEPIME HYDROCHLORIDE 2 G: 2 INJECTION, POWDER, FOR SOLUTION INTRAVENOUS at 10:00

## 2019-05-27 RX ADMIN — DILTIAZEM HYDROCHLORIDE 10 MG: 5 INJECTION INTRAVENOUS at 09:39

## 2019-05-27 RX ADMIN — DILTIAZEM HYDROCHLORIDE 30 MG: 30 TABLET, FILM COATED ORAL at 18:27

## 2019-05-27 RX ADMIN — ROSUVASTATIN CALCIUM 10 MG: 10 TABLET, FILM COATED ORAL at 21:13

## 2019-05-27 RX ADMIN — VANCOMYCIN HYDROCHLORIDE 1500 MG: 10 INJECTION, POWDER, LYOPHILIZED, FOR SOLUTION INTRAVENOUS at 11:00

## 2019-05-27 RX ADMIN — DILTIAZEM HYDROCHLORIDE 30 MG: 30 TABLET, FILM COATED ORAL at 12:37

## 2019-05-27 RX ADMIN — Medication 10 ML: at 21:13

## 2019-05-27 RX ADMIN — SODIUM CHLORIDE 1000 ML: 9 INJECTION, SOLUTION INTRAVENOUS at 09:59

## 2019-05-27 RX ADMIN — CEFEPIME HYDROCHLORIDE 2 G: 2 INJECTION, POWDER, FOR SOLUTION INTRAVENOUS at 18:27

## 2019-05-27 RX ADMIN — OXYCODONE HYDROCHLORIDE AND ACETAMINOPHEN 500 MG: 500 TABLET ORAL at 18:27

## 2019-05-27 ASSESSMENT — ENCOUNTER SYMPTOMS
NAUSEA: 0
ABDOMINAL PAIN: 0
SHORTNESS OF BREATH: 0
COUGH: 1
COLOR CHANGE: 0
CONSTIPATION: 0
DIARRHEA: 0
VOMITING: 0
BACK PAIN: 0

## 2019-05-27 ASSESSMENT — PAIN SCALES - GENERAL
PAINLEVEL_OUTOF10: 7
PAINLEVEL_OUTOF10: 3

## 2019-05-27 NOTE — PROGRESS NOTES
Assisted pt up to bathroom and back into bed. Pt steady on feet but states feels a little weak. Pt agrees to call RN when needing to get OOB. Denies any needs. Call light within reach. Bed alarm engaged.

## 2019-05-27 NOTE — PROGRESS NOTES
Pt arrived to unit in stable conditions. VSS. A&O. Pt oriented to room and call light system. Pt denies any questions or any needs. Call light within reach. Bed alarm engaged.

## 2019-05-27 NOTE — ED PROVIDER NOTES
I independently performed a history and physical on [facility]. All diagnostic, treatment, and disposition decisions were made by myself in conjunction with the advanced practice provider. Briefly, this is a 77 y.o. female here for Fever as high as 101, generalized weakness/fatigue, nonproductive cough. The patient has had these symptoms developing for about a week. The patient is currently being treated for CLL. .    On exam, Patient is mildly ill-appearing, but nontoxic. Her heart is tachycardic and irregular, consistent with the patient's underlying atrial fibrillation. Her lungs are clear to auscultation bilaterally. EKG  The Ekg interpreted by me in the absence of a cardiologist shows. atrial fibrillation with a rate of 134  Axis is   Normal  QTc is  normal  Intervals and Durations are unremarkable. No specific ST-T wave changes appreciated. No evidence of acute ischemia. No significant change from prior EKG dated 12/27/2011          FINAL IMPRESSION  1. Pneumonia of right lower lobe due to infectious organism ([facility] [address]    2. Personal history of CLL (chronic lymphocytic leukemia)    3. Atrial fibrillation with RVR (HCC)        Blood pressure 105/77, pulse 109, temperature 99.5 °F (37.5 °C), temperature source Oral, resp. rate 18, height 5' 7\" (1.702 m), weight 228 lb (103.4 kg), SpO2 91 %.      For further details of [facility] emergency department encounter, please see documentation by advanced practice provider, SELIN Richardson.        Milana Apley, MD  06/20/19 [id number]

## 2019-05-27 NOTE — H&P
Hospital Medicine History & Physical      PCP: Tucker Arreaga DO    Date of Admission: 5/27/2019    Date of Service: Pt seen/examined on 05/27/19 and Admitted to Inpatient with expected LOS greater than two midnights due to medical therapy of pneumonia and atrial fibrillation    Chief Complaint:  Weakness, cough      History Of Present Illness:      77 y.o. female who presented to [facility] emergency room. Low-grade persistent fever over past 1 week. The highest temperature was 101° F. A few weeks ago, patient had her otis pulled up in her home. Mold was noted underneath. Patient had 2 live outside her home until everything was repaired, but believes that she had  To breathe in the mold for a short period of time. Over past few days, started feeling very weak and fatigue. When she took her temperature, she noted that it was between 100 and 101°F.  Took some doses of Tylenol. Although Tylenol provided some symptomatic relief, it was short-lived. Over past week, she also noticed that she had some cough. The cough is nonproductive, but sounds wet. No hemoptysis. Came to the emergency room because of failure to improve over her symptoms and progressive cough with slight increase in temperature. Never had anything like this before. No other accompanying symptoms  Nothing else that makes the patient feel better or worse  In the emergency room, patient was also found to be tachycardic. EKG showed atrial fibrillation with rapid ventricular response. Patient never had heart rhythm issues before. Comfortable at the time of this interview.         Past Medical History:          Diagnosis Date    Anemia     [facility]ocytic [facility]) [facility] [address]     Hernia     Hypertension     Leukemia ([facility] [address]        Past Surgical History:          Procedure Laterality Date    HYSTERECTOMY      OVARY REMOVAL      VARICOSE VEIN SURGERY         Medications Prior to Admission:      Prior to Admission medications    Medication Sig Start Date End Date Taking? Authorizing Provider   vitamin D (ERGOCALCIFEROL) 50639 units CAPS capsule Take 1 capsule by mouth once a week 4/1/19  Yes Oren Mola, DO   rosuvastatin (CRESTOR) 10 MG tablet Take 1 tablet by mouth daily 4/1/19  Yes Oren Mola, DO   amLODIPine (NORVASC) 5 MG tablet TAKE 1 TABLET BY MOUTH ONE TIME A DAY  2/25/19  Yes Oren Mola, DO   fexofenadine [facility] ALLERGY) 180 MG tablet Take 1 tablet by mouth daily 10/22/18  Yes Oren Mola, DO   IMBRUVICA 140 MG chemo capsule  12/22/15  Yes Historical Provider, MD   Ascorbic Acid (VITAMIN C) 500 MG tablet Take 500 mg by mouth daily   Yes Historical Provider, MD       Allergies:  Amoxicillin; Lisinopril; Molds & smuts; and Penicillins    Social History:      The patient currently lives at home    TOBACCO:   reports that she quit smoking about 3 years ago. Her smoking use included cigarettes. She started smoking about 51 years ago. She smoked 0.05 packs per day for 0.00 years. She has never used smokeless tobacco.  ETOH:   reports that she does not drink alcohol. Family History:      Reviewed in detail and negative for DM, CAD, Cancer, CVA. Positive as follows:        Problem Relation Age of Onset    Cancer Mother     Allergy (Severe) Mother     Cancer Father     Mental Illness Sister        REVIEW OF SYSTEMS:   Pertinent positives as noted in the HPI. Cough, weakness, low-grade fever. All other systems reviewed and negative. PHYSICAL EXAM PERFORMED:    /65   Pulse 118   Temp 97.2 °F (36.2 °C) (Infrared)   Resp 23   Wt 228 lb (103.4 kg)   SpO2 92%   BMI 35.71 kg/m²     General appearance:  No apparent distress, appears stated age and cooperative. HEENT:  Normal cephalic, atraumatic without obvious deformity. Pupils equal, round, and reactive to light. Extra ocular muscles intact. Conjunctivae/corneas clear. Neck: Supple, with full range of motion.  No 05/27/2019         PLAN:    Pneumonia  Wide spectrum antibiotics started according to oncology recommendations  Cultures are pending  Check daily basic metabolic panel and regular vitals  Continue symptomatic and supportive treatment  Because of underlying hematologic malignancy and immunosuppressed status, I will also check histoplasma antigen in the urine. Atrial fibrillation with rapid ventricular response  Patient has no history of arrhythmia. Most likely caused by acute illness  Heart rate is in low 100 after 1 dose of IV diltiazem  No need for diltiazem drip. I will start the patient on oral short-acting diltiazem and stop the Norvasc. Cardiology consulted  TSH ordered  Echocardiogram also ordered    Chronic lymphocytic leukemia  Oncology consulted  I will also check immunoglobulin levels, as recommended by oncology  Patient's chemotherapy drug will be put on hold while pneumonia treatment is in process    Hypertension  Monitor vitals. I am discontinuing Norvasc because of duplication with diltiazem  May need further adjustment of antihypertensive treatment. Dehydration  BUN elevated. Patient appears clinically dehydrated. Continue IV hydration. Discussed with patient, questions answered  Discussed with Dr. Rodrigo Loving    DVT Prophylaxis: Lovenox  Diet: DIET GENERAL;  Code Status: Full Code    PT/OT Eval Status: Requested, pending    Dispo - inpatient, pending treatment of atrial fibrillation as well as pneumonia. Ruthie Blanchard MD    Thank you [facility] for the opportunity to be involved in this patient's care. If you have any questions or concerns please feel free to contact me at [phone].

## 2019-05-27 NOTE — ED PROVIDER NOTES
[address] [facility]  eMERGENCY dEPARTMENT eNCOUnter        Pt Name: Jimbo Hernandez  MRN: [id number]  [place] 1950  Date of evaluation: 5/27/2019  Provider: SELIN Jung  PCP: Garry Lane DO  ED Attending: Alyssa Camilo MD    CHIEF COMPLAINT       Chief Complaint   Patient presents with    Fever     pt reporting that about 2 weeks pt had otis pulled up with noted mold under, starting 1 week ago started with fever , pt with hx of leukemia, pts friend would like checked for pnuemonia, pt does state that her lungs hurt and she feels weak. HISTORY OF PRESENT ILLNESS   (Location/Symptom, Timing/Onset, Context/Setting, Quality, Duration, Modifying Factors, Severity)  Note limiting factors. Jimbo Hernandez is a 77 y.o. female who presents to the emergency department with complaints of having low-grade fever over the past 1 week, states T-max 101°F.  Patient states that a few weeks ago she had her otis pulled up in her home and there was noted to be mold underneath. She states that she breathed in the mold. Was told by her oncologist to stay away from home. She does have a history of CLL. She is currently undergoing daily chemotherapy orally. Patient states that over the past several days she has felt very weak and fatigued. She did have a dose of acetaminophen around 3:00 this morning. She states that this does help with her symptoms. Patient feels like her lungs hurt and she feels fatigued. Denies any aggravating symptoms. She states that her cough is nonproductive however there is family at bedside who states that they heard her coughing last night and it sounded productive. Denies hemoptysis. Denies any other complaints including abdominal pain, nausea, vomiting, diarrhea or constipation. No urinary symptoms. She is Dr. Wendie Talbert for oncology.     Nursing Notes were all reviewed and agreed with or any disagreements were addressed  in the HPI.    REVIEW OF SYSTEMS    (2-9 systems for level 4, 10 or more for level 5)     Review of Systems   Constitutional: Positive for fatigue and fever. Negative for chills. Respiratory: Positive for cough. Negative for shortness of breath. Cardiovascular: Negative for chest pain. Gastrointestinal: Negative for abdominal pain, constipation, diarrhea, nausea and vomiting. Genitourinary: Negative for difficulty urinating, dysuria and frequency. Musculoskeletal: Negative for back pain and neck pain. Skin: Negative for color change and rash. All other systems reviewed and are negative. Positives and pertinent negatives as per HPI. All other systems were reviewed and are negative. PHYSICAL EXAM    (up to 7 for level 4, 8 or more for level 5)     Vitals:    05/27/19 0933 05/27/19 0936 05/27/19 0945 05/27/19 0948   BP:  125/71  103/65   Pulse: 136 121  118   Resp: 25 18  23   Temp:       TempSrc:       SpO2: 93% 93%  92%   Weight:   228 lb (103.4 kg)      Physical Exam   Constitutional: She is oriented to person, place, and time. She appears well-developed and well-nourished. She is active and cooperative. Non-toxic appearance. HENT:   Head: Normocephalic. Right Ear: External ear normal.   Left Ear: External ear normal.   Nose: Nose normal.   Eyes: Conjunctivae are normal. Right eye exhibits no discharge. Left eye exhibits no discharge. Neck: Normal range of motion. Neck supple. Cardiovascular: Normal heart sounds. An irregularly irregular rhythm present. Tachycardia present. Exam reveals no gallop and no friction rub. No murmur heard. Pulmonary/Chest: Effort normal and breath sounds normal. No stridor. No respiratory distress. She has no wheezes. She has no rales. Abdominal: Soft. Bowel sounds are normal. She exhibits no distension and no mass. There is no tenderness. There is no guarding. Musculoskeletal: Normal range of motion.    Neurological: She is alert and oriented to person, place, and time. Skin: Skin is warm and dry. She is not diaphoretic. No pallor. Psychiatric: She has a normal mood and affect. Her behavior is normal.   Nursing note and vitals reviewed.       PAST MEDICAL HISTORY     Past Medical History:   Diagnosis Date    Anemia     [facility]LL [facility]chronic lymphocytic leukemia) ([facility] [address]     Hernia     Hypertension     [facility]        SURGICAL HISTORY       Past Surgical History:   Procedure Laterality Date    HYSTERECTOMY      OVARY REMOVAL      VARICOSE VEIN SURGERY         CURRENT MEDICATIONS       Previous Medications    AMLODIPINE (NORVASC) 5 MG TABLET    TAKE 1 TABLET BY MOUTH ONE TIME A DAY     ASCORBIC ACID (VITAMIN C) 500 MG TABLET    Take 500 mg by mouth daily    FEXOFENADINE (ALLEGRA ALLERGY) 180 MG TABLET    Take 1 tablet by mouth daily    IMBRUVICA 140 MG CHEMO CAPSULE        ROSUVASTATIN (CRESTOR) 10 MG TABLET    Take 1 tablet by mouth daily    VITAMIN D (ERGOCALCIFEROL) 15256 UNITS CAPS CAPSULE    Take 1 capsule by mouth once a week       ALLERGIES     Amoxicillin; Lisinopril; Molds & smuts; and Penicillins    FAMILY HISTORY       Family History   Problem Relation Age of Onset    Cancer Mother     Allergy (Severe) Mother     Cancer Father     Mental Illness Sister         SOCIAL HISTORY       Social History     Socioeconomic History    Marital status: Single     Spouse name: None    Number of children: None    Years of education: None    Highest education level: None   Occupational History    None   Social Needs    Financial resource strain: None    Food insecurity:     Worry: None     Inability: None    Transportation needs:     Medical: None     Non-medical: None   Tobacco Use    Smoking status: Former Smoker     Packs/day: 0.05     Years: 0.00     Pack years: 0.00     Types: Cigarettes     Start date: 1968     Last attempt to quit: 2016     Years since quitting: 3.4    Smokeless tobacco: Never Used    Tobacco comment: less than 1 pack per week No urinary symptoms. She is Dr. Ary Sequeira for oncology. On exam patient is well-appearing, she does have a productive cough while in the ER. She does have an elevated white count of 11.9 compared to her baseline. BUN of 23 and 1.1. Lactic acid. Chest x-ray does reveal a right lower lobe pneumonia. Patient's option level has been between 90-94% on room air with slight tachypnea. Patient was noted to be in atrial fibrillation with RVR, she is no prior history of this in the past, this is likely secondary to be new infection. Patient will be given one dose of IV Cardizem at 10 mg, this is slowed her rate down to the 110s. Patient's blood pressures 103/65 at the time. I did not start patient on a Cardizem drip. Patient started on IV cefepime and vancomycin. I did call Dr. Ary Sequeira who has agreed to consult during admission. Hospitalist to admit patient. Critical Care  There was a high probability of life-threatening clinical deterioration in the patient's condition requiring my urgent intervention. Total critical care time with the patient was 35 minutes excluding separately reportable procedures. Critical care required due to patients pneumonia, new onset atrial fibrillation with RVR. The patient tolerated their visit well. They were seen and evaluated by the attending physician, who agreed with the assessment and plan. I have discussed the findings of today's workup with the patient and addressed the patient's questions and concerns. FINAL IMPRESSION      1. Pneumonia of right lower lobe due to infectious organism ([address]    2. Personal history of CLL (chronic lymphocytic leukemia)    3. Atrial fibrillation with RVR ([facility] [address]        DISPOSITION/PLAN   DISPOSITION Decision To Admit 05/27/2019 09:31:02 AM      PATIENT REFERRED TO:  No follow-up provider specified.     DISCHARGE MEDICATIONS:  New Prescriptions    No medications on file       DISCONTINUED MEDICATIONS:  Discontinued Medications    No medications on file            (Please note that portions of this note were completed with a voice recognition program.  Efforts were made to edit the dictations but occasionally words aremis-transcribed.)    SELIN Ocampo (electronically signed)            SELIN Esparza  05/27/19 3817

## 2019-05-27 NOTE — ONCOLOGY
Oncology Hematology Care   Consult Note      Reason for Consult:  Pneumonia cll  Requesting Physician:   Cynthia Nava     CHIEF COMPLAINT:  Shortness of breath cough    History Obtained From: patient    HISTORY OF PRESENT ILLNESS:  MS Tamra Mohs is a 77year old lady who has been treated with ibrutinib for cll for the last 4 years. She has done very well with there treatment. She recently had floor replaced in her home and found mold. She developed a cough and shortness of breath. She states the cough is not productive. She has not had a fever. She came to the emergency room and was found to have pulmonary infiltrate. She is also in atrial fibrillation      Past Medical History:     has a past medical history of Anemia, CLL (chronic lymphocytic leukemia) ([facility] [address] Hernia, Hypertension, and Leukemia ([facility] [address]    Past Surgical History:    Past Surgical History:   Procedure Laterality Date    APPENDECTOMY      HYSTERECTOMY      OVARY REMOVAL      VARICOSE VEIN SURGERY        Current Medications:    Current Facility-Administered Medications   Medication Dose Route Frequency Provider Last Rate Last Dose    vitamin C (ASCORBIC ACID) tablet 500 mg  500 mg Oral Daily Caitlin Richter MD        rosuvastatin (CRESTOR) tablet 10 mg  10 mg Oral Nightly Caitlin Richter MD        sodium chloride flush 0.9 % injection 10 mL  10 mL Intravenous 2 times per day Caitlin Richter MD        sodium chloride flush 0.9 % injection 10 mL  10 mL Intravenous PRN Caitlin Richter MD        magnesium hydroxide (MILK OF MAGNESIA) 400 MG/5ML suspension 30 mL  30 mL Oral Daily PRN Caitlin Richter MD        ondansetron (ZOFRAN) injection 4 mg  4 mg Intravenous Q6H PRN Caitlin Richter MD        [START ON 5/28/2019] enoxaparin (LOVENOX) injection 40 mg  40 mg Subcutaneous Daily Tex Ontiveros MD        0.9 % sodium chloride infusion   Intravenous Continuous Caitlin Richter MD 75 mL/hr at 05/27/19 1237      albuterol (PROVENTIL) nebulizer solution 2.5 mg  2.5 mg Nebulization Q2H PRN Brianna Sarah MD        acetaminophen (TYLENOL) tablet 650 mg  650 mg Oral Q4H PRN Brianna Sarah MD        vancomycin (VANCOCIN) 1,500 mg in dextrose 5 % 250 mL IVPB  15 mg/kg Intravenous Q12H Brianna Sarah MD        cefepime (MAXIPIME) 2 g IVPB minibag  2 g Intravenous Q8H Tex Ontiveros MD        diltiazem (CARDIZEM) tablet 30 mg  30 mg Oral 4 times per day Brianna Sarah MD   30 mg at 05/27/19 1237     Allergies: Allergies   Allergen Reactions    Amoxicillin     Lisinopril Other (See Comments)     States it caused depression     Molds & Smuts     Penicillins       Social History:    reports that she quit smoking about 3 years ago. Her smoking use included cigarettes. She started smoking about 51 years ago. She smoked 0.05 packs per day for 0.00 years. She has never used smokeless tobacco. She reports that she does not drink alcohol or use drugs. Family History:     family history includes Allergy (Severe) in her mother; Cancer in her father and mother; Mental Illness in her sister. REVIEW OF SYSTEMS:      · Constitutional: Denies fever, chills, sweats, has had recent mild  weight loss. Denies pain. · Eyes: No visual changes or diplopia. No scleral icterus. · ENT: No Headaches, hearing loss or vertigo. No mouth sores or sore throat. · Cardiovascular: No chest pain,has had some shortness of breath and palpitations   · Respiratory: has cough recently , no sputum production. No hemoptysis. · Gastrointestinal: No abdominal pain, appetite loss, blood in stools. No change in bowel habits. · Genitourinary: No dysuria, trouble voiding, or hematuria. · Musculoskeletal:   No generalized weakness. No joint complaints. · Integumentary: No rash or pruritis. · Neurological: No headache, diplopia. No change in gait, balance, or coordination. No focal neurological deficits including weakness, numbness, or tingling.   · Endocrine: No temperature intolerance. No excessive thirst, fluid intake, or urination. · Hematologic/Lymphatic: No abnormal bruising or ecchymoses, blood clots or swollen lymph nodes. · Allergic/Immunologic: No nasal congestion or hives. ·     PHYSICAL EXAM:    Vitals:  Vitals:    05/27/19 1140   BP: 111/70   Pulse: 111   Resp: 18   Temp: 97.9 °F (36.6 °C)   SpO2: 94%      CONSTITUTIONAL:  awake, alert, cooperative, no apparent distress  EYES:  pupils equal, round and reactive to light, sclera clear and conjunctiva normal  ENT:  normocepalic, without obvious abnormality, atramatic  NECK:  supple, symmetrical, no jugular venous distension and no carotid bruits  HEMATOLOGIC/LYMPHATICS:  No cervical,supraclavicular or axillary lymphadenopathy  LUNGS:  No increased work of breathing and clear to auscultation  CARDIOVASCULAR: Regular rate and rhythm, normal S1 and S2, no murmur noted  ABDOMEN:  Normal bowel sounds x 4, soft, non-distended, non-tender, no masses palpated, no hepatosplenomegally  MUSCULOSKELETAL:  full range of motion noted, tone is normal  EXTREMITIES: no LE edema  NEUROLOGIC:  Awake, alert, oriented to name, place and time. Motor skills grossly intact. SKIN:  normal skin color, texture, turgor and no jaundice. Appears intact.     DATA:  General Labs:    CBC:   Recent Labs     05/27/19  0838   WBC 11.8*   HGB 12.1   HCT 37.1   MCV 85.4        BMP:   Recent Labs     05/27/19  0839      K 4.1      CO2 23   BUN 23*   CREATININE 1.1     LIVER PROFILE:   Recent Labs     05/27/19  0839   AST 13*   ALT 16   BILITOT 0.4   ALKPHOS 135*     PT/INR:    Lab Results   Component Value Date    PROTIME 13.5 05/27/2019    INR 1.18 05/27/2019     PTT:  No results found for: APTT  Magnesium:  No results found for: MG    Imaging:  Xr Chest Standard (2 Vw)    Result Date: 5/27/2019  EXAMINATION: TWO XRAY VIEWS OF THE CHEST 5/27/2019 7:57 am COMPARISON: 02/13/2018 HISTORY: ORDERING SYSTEM PROVIDED HISTORY: fever TECHNOLOGIST PROVIDED HISTORY: Reason for exam:->fever Ordering Physician Provided Reason for Exam: FEVER, COUGH AND CONGESTION. FORMER SMOKER. HISTORY OF CHRONIC LYMPHOCYTIC LEUKEMIA, HTN Acuity: Acute Type of Exam: Initial FINDINGS: Patient in lordotic position. Mediastinal silhouette is prominent. No acute bony abnormality appreciated. Degenerative changes noted about the thoracic spine. Left lung appears grossly clear. Right lung demonstrates basilar consolidation with small pleural effusion. New focal consolidation at the right lung base. Pneumonia is a primary consideration. Asymmetric pulmonary edema is also considered. Clinical correlation is recommended. Recommend follow-up two-view chest x-ray in 2-4 weeks time following treatment to ensure resolution. Assessment & Plan:  Pneumonia agree with empiric antibiotics since she is immune compromised  cll has done well with ibrutinib for 4 year  Atrial fibrillation has been described as side effect of ibrutinib  Will check immunoglobulin levels          I have discussed the above stated plan with the patient and they verbalized understanding and agreed with the plan. Thank you for allowing us to participate in this patients care.     Judy Camilo MD  5/27/2019, 12:46 PM

## 2019-05-28 LAB
A/G RATIO: 1.1 (ref 1.1–2.2)
ALBUMIN SERPL-MCNC: 2.8 G/DL (ref 3.4–5)
ALP BLD-CCNC: 122 U/L (ref 40–129)
ALT SERPL-CCNC: 15 U/L (ref 10–40)
ANION GAP SERPL CALCULATED.3IONS-SCNC: 12 MMOL/L (ref 3–16)
AST SERPL-CCNC: 8 U/L (ref 15–37)
BASOPHILS ABSOLUTE: 0.1 K/UL (ref 0–0.2)
BASOPHILS RELATIVE PERCENT: 1.2 %
BILIRUB SERPL-MCNC: <0.2 MG/DL (ref 0–1)
BUN BLDV-MCNC: 17 MG/DL (ref 7–20)
CALCIUM SERPL-MCNC: 8.6 MG/DL (ref 8.3–10.6)
CHLORIDE BLD-SCNC: 109 MMOL/L (ref 99–110)
CO2: 21 MMOL/L (ref 21–32)
CREAT SERPL-MCNC: 0.9 MG/DL (ref 0.6–1.2)
EKG ATRIAL RATE: 131 BPM
EKG DIAGNOSIS: NORMAL
EKG Q-T INTERVAL: 322 MS
EKG QRS DURATION: 88 MS
EKG QTC CALCULATION (BAZETT): 480 MS
EKG R AXIS: 23 DEGREES
EKG T AXIS: 106 DEGREES
EKG VENTRICULAR RATE: 134 BPM
EOSINOPHILS ABSOLUTE: 0.1 K/UL (ref 0–0.6)
EOSINOPHILS RELATIVE PERCENT: 0.6 %
GFR AFRICAN AMERICAN: >60
GFR NON-AFRICAN AMERICAN: >60
GLOBULIN: 2.6 G/DL
GLUCOSE BLD-MCNC: 111 MG/DL (ref 70–99)
HCT VFR BLD CALC: 31.8 % (ref 36–48)
HEMOGLOBIN: 10.4 G/DL (ref 12–16)
L. PNEUMOPHILA SEROGP 1 UR AG: NORMAL
LV EF: 68 %
LVEF MODALITY: NORMAL
LYMPHOCYTES ABSOLUTE: 0.9 K/UL (ref 1–5.1)
LYMPHOCYTES RELATIVE PERCENT: 10.1 %
MCH RBC QN AUTO: 28.1 PG (ref 26–34)
MCHC RBC AUTO-ENTMCNC: 32.8 G/DL (ref 31–36)
MCV RBC AUTO: 85.5 FL (ref 80–100)
MONOCYTES ABSOLUTE: 0.9 K/UL (ref 0–1.3)
MONOCYTES RELATIVE PERCENT: 10.1 %
NEUTROPHILS ABSOLUTE: 7 K/UL (ref 1.7–7.7)
NEUTROPHILS RELATIVE PERCENT: 78 %
PDW BLD-RTO: 15.1 % (ref 12.4–15.4)
PLATELET # BLD: 259 K/UL (ref 135–450)
PMV BLD AUTO: 9.6 FL (ref 5–10.5)
POTASSIUM REFLEX MAGNESIUM: 4 MMOL/L (ref 3.5–5.1)
RBC # BLD: 3.72 M/UL (ref 4–5.2)
SODIUM BLD-SCNC: 142 MMOL/L (ref 136–145)
STREP PNEUMONIAE ANTIGEN, URINE: NORMAL
TOTAL PROTEIN: 5.4 G/DL (ref 6.4–8.2)
WBC # BLD: 9 K/UL (ref 4–11)

## 2019-05-28 PROCEDURE — 2500000003 HC RX 250 WO HCPCS

## 2019-05-28 PROCEDURE — 2580000003 HC RX 258: Performed by: INTERNAL MEDICINE

## 2019-05-28 PROCEDURE — 93010 ELECTROCARDIOGRAM REPORT: CPT | Performed by: INTERNAL MEDICINE

## 2019-05-28 PROCEDURE — 85025 COMPLETE CBC W/AUTO DIFF WBC: CPT

## 2019-05-28 PROCEDURE — 1200000000 HC SEMI PRIVATE

## 2019-05-28 PROCEDURE — 33285 INSJ SUBQ CAR RHYTHM MNTR: CPT | Performed by: INTERNAL MEDICINE

## 2019-05-28 PROCEDURE — 80053 COMPREHEN METABOLIC PANEL: CPT

## 2019-05-28 PROCEDURE — 99223 1ST HOSP IP/OBS HIGH 75: CPT | Performed by: INTERNAL MEDICINE

## 2019-05-28 PROCEDURE — 36415 COLL VENOUS BLD VENIPUNCTURE: CPT

## 2019-05-28 PROCEDURE — C1764 EVENT RECORDER, CARDIAC: HCPCS

## 2019-05-28 PROCEDURE — 93306 TTE W/DOPPLER COMPLETE: CPT

## 2019-05-28 PROCEDURE — 0JH632Z INSERTION OF MONITORING DEVICE INTO CHEST SUBCUTANEOUS TISSUE AND FASCIA, PERCUTANEOUS APPROACH: ICD-10-PCS | Performed by: INTERNAL MEDICINE

## 2019-05-28 PROCEDURE — 97161 PT EVAL LOW COMPLEX 20 MIN: CPT

## 2019-05-28 PROCEDURE — 97116 GAIT TRAINING THERAPY: CPT

## 2019-05-28 PROCEDURE — 97530 THERAPEUTIC ACTIVITIES: CPT

## 2019-05-28 PROCEDURE — 33285 INSJ SUBQ CAR RHYTHM MNTR: CPT

## 2019-05-28 PROCEDURE — 6370000000 HC RX 637 (ALT 250 FOR IP): Performed by: INTERNAL MEDICINE

## 2019-05-28 PROCEDURE — 97535 SELF CARE MNGMENT TRAINING: CPT

## 2019-05-28 PROCEDURE — 6360000002 HC RX W HCPCS: Performed by: INTERNAL MEDICINE

## 2019-05-28 PROCEDURE — 97165 OT EVAL LOW COMPLEX 30 MIN: CPT

## 2019-05-28 RX ADMIN — DILTIAZEM HYDROCHLORIDE 30 MG: 30 TABLET, FILM COATED ORAL at 06:16

## 2019-05-28 RX ADMIN — DILTIAZEM HYDROCHLORIDE 30 MG: 30 TABLET, FILM COATED ORAL at 17:46

## 2019-05-28 RX ADMIN — DILTIAZEM HYDROCHLORIDE 30 MG: 30 TABLET, FILM COATED ORAL at 13:06

## 2019-05-28 RX ADMIN — SODIUM CHLORIDE: 9 INJECTION, SOLUTION INTRAVENOUS at 06:17

## 2019-05-28 RX ADMIN — OXYCODONE HYDROCHLORIDE AND ACETAMINOPHEN 500 MG: 500 TABLET ORAL at 08:24

## 2019-05-28 RX ADMIN — ROSUVASTATIN CALCIUM 10 MG: 10 TABLET, FILM COATED ORAL at 20:29

## 2019-05-28 RX ADMIN — AZITHROMYCIN MONOHYDRATE 500 MG: 500 INJECTION, POWDER, LYOPHILIZED, FOR SOLUTION INTRAVENOUS at 15:46

## 2019-05-28 RX ADMIN — DILTIAZEM HYDROCHLORIDE 30 MG: 30 TABLET, FILM COATED ORAL at 00:49

## 2019-05-28 RX ADMIN — CEFEPIME HYDROCHLORIDE 2 G: 2 INJECTION, POWDER, FOR SOLUTION INTRAVENOUS at 17:46

## 2019-05-28 RX ADMIN — Medication 10 ML: at 08:24

## 2019-05-28 RX ADMIN — CEFEPIME HYDROCHLORIDE 2 G: 2 INJECTION, POWDER, FOR SOLUTION INTRAVENOUS at 02:26

## 2019-05-28 RX ADMIN — ENOXAPARIN SODIUM 40 MG: 40 INJECTION SUBCUTANEOUS at 08:25

## 2019-05-28 RX ADMIN — VANCOMYCIN HYDROCHLORIDE 1500 MG: 10 INJECTION, POWDER, LYOPHILIZED, FOR SOLUTION INTRAVENOUS at 13:06

## 2019-05-28 RX ADMIN — CEFEPIME HYDROCHLORIDE 2 G: 2 INJECTION, POWDER, FOR SOLUTION INTRAVENOUS at 10:54

## 2019-05-28 RX ADMIN — VANCOMYCIN HYDROCHLORIDE 1500 MG: 10 INJECTION, POWDER, LYOPHILIZED, FOR SOLUTION INTRAVENOUS at 00:49

## 2019-05-28 RX ADMIN — Medication 10 ML: at 20:29

## 2019-05-28 ASSESSMENT — PAIN SCALES - GENERAL
PAINLEVEL_OUTOF10: 0

## 2019-05-28 ASSESSMENT — PAIN SCALES - WONG BAKER: WONGBAKER_NUMERICALRESPONSE: 0

## 2019-05-28 NOTE — PROGRESS NOTES
Bedside rounding with Tia Damon RN. Pt resting in bed with eyes closed, respirations even and unlabored. Call light noted to be resting in pt's lap. All needs appear within reach. White board updated. Bed alarm set. Will continue to monitor.  Electronically signed by Mandeep Cooper RN on 5/28/2019 at 7:11 AM

## 2019-05-28 NOTE — PROGRESS NOTES
Occupational Therapy   Occupational Therapy Initial Assessment  Date: 2019   Patient Name: Dee Dee Rosales  MRN: [id number]     : 1950    Date of Service: 2019    Discharge Recommendations: Dee Dee Rosales scored a 18/24 on the AM-PAC ADL Inpatient form. Current research shows that an AM-PAC score of 18 or greater is typically associated with a discharge to the patient's home setting. Based on the patients AM-PAC score and their current ADL deficits, it is recommended that the patient have 2-3 sessions per week of Occupational Therapy at d/c to increase the patients independence. HOME HEALTH CARE: LEVEL 1 STANDARD    - Initial home health evaluation to occur within 24-48 hours, in patient home   - Therapy to evaluate with goal of regaining prior level of functioning   - Therapy to evaluate if patient has [address] needs for personal care       OT Equipment Recommendations  Equipment Needed: Yes  Other: pt may benefit from shower stool     Assessment   Performance deficits / Impairments: Decreased functional mobility ; Decreased ADL status; Decreased high-level IADLs;Decreased balance  Assessment: pt not at baseline and would benefit from skilled OT services at this time   Treatment Diagnosis: pt presents with decreased functional mobility, ADL status, IADL status, and balance secondary to PNA  Prognosis: Good  Decision Making: Low Complexity  History: pt lives at home alone. pt independent with ADL/IADL tasks at baseline  Assistance / Modification: CGA to SBA with RW  Patient Education: OT eval, POC, discharge, safety awareness, cues for impulsivity  Barriers to Learning: pt somewhat impulsive  REQUIRES OT FOLLOW UP: Yes  Activity Tolerance  Activity Tolerance: Patient Tolerated treatment well  Safety Devices  Safety Devices in place: Yes  Type of devices: All fall risk precautions in place; Patient at risk for falls;Call light within reach;Nurse notified; Left in bed;Bed alarm in place;Gait belt           Patient Diagnosis(es): The primary encounter diagnosis was Pneumonia of right lower lobe due to infectious organism [facility]). Diagnoses of Personal history of CLL (chronic lymphocytic leukemia) and Atrial fibrillation with RVR ([facility] [address] were also pertinent to this visit. has a past medical history of Anemia, CLL (chronic lymphocytic leukemia) ([facility] [address] Hernia, Hypertension, and Leukemia ([facility] [address] has a past surgical history that includes Hysterectomy; Varicose vein surgery; Ovary removal; and Appendectomy. Treatment Diagnosis: pt presents with decreased functional mobility, ADL status, IADL status, and balance secondary to PNA      Restrictions  Restrictions/Precautions  Restrictions/Precautions: Fall Risk(med fall risk)  Position Activity Restriction  Other position/activity restrictions: Renata Au is a 77 y.o. female who presents to the emergency department with complaints of having low-grade fever over the past 1 week, states T-max 101°F.  Patient states that a few weeks ago she had her otis pulled up in her home and there was noted to be mold underneath. She states that she breathed in the mold. Was told by her oncologist to stay away from home. She does have a history of CLL. She is currently undergoing daily chemotherapy orally. Patient states that over the past several days she has felt very weak and fatigued. She did have a dose of acetaminophen around 3:00 this morning. She states that this does help with her symptoms. Patient feels like her lungs hurt and she feels fatigued. Denies any aggravating symptoms. She states that her cough is nonproductive however there is family at bedside who states that they heard her coughing last night and it sounded productive. Denies hemoptysis. Denies any other complaints including abdominal pain, nausea, vomiting, diarrhea or constipation. No urinary symptoms. She is Dr. Jeane Finnegan for oncology.     Subjective   General  Chart Reviewed: Time   Individual Concurrent Group Co-treatment   Time In 1125         Time Out 1203         Minutes 38           Timed Code Treatment Minutes:  23 Minutes    Total Treatment Minutes:  [address] , OTR/L Bob OT

## 2019-05-28 NOTE — CARE COORDINATION
Discharge Planning Assessment  RN/SW discharge planner met with patient/(and family member) to discuss reason for admission, current living situation, and potential needs at the time of discharge    Demographics/Insurance verified Yes    Current type of dwelling: lives in an apt    Living arrangements: home alone    Level of function/Support: stated independent w/ADL's    PCP: Carolina Rucker    Last Visit to PCP: 2 mos ago    DME:stated none    Active with any community resources/agencies/skilled home care:stated none    Medication compliance issues:stated no issues    Financial issues that could impact healthcare: stated no issues    Transportation at the time of discharge:friend    Tentative discharge plan: home w/no needs    Electronically signed by JACEY Hernandez on 5/28/2019 at 5:03 PM

## 2019-05-28 NOTE — PROGRESS NOTES
Oncology Hematology Care   Progress Note      5/28/2019 9:48 AM        Name: Viridiana Momin . Admitted: 5/27/2019    SUBJECTIVE:  She reports that she is feeling better this morning. No new complaints. No SOB. Reviewed interval ancillary notes    Current Medications    vitamin C (ASCORBIC ACID) tablet 500 mg Daily   rosuvastatin (CRESTOR) tablet 10 mg Nightly   sodium chloride flush 0.9 % injection 10 mL 2 times per day   sodium chloride flush 0.9 % injection 10 mL PRN   magnesium hydroxide (MILK OF MAGNESIA) 400 MG/5ML suspension 30 mL Daily PRN   ondansetron (ZOFRAN) injection 4 mg Q6H PRN   enoxaparin (LOVENOX) injection 40 mg Daily   0.9 % sodium chloride infusion Continuous   albuterol (PROVENTIL) nebulizer solution 2.5 mg Q2H PRN   acetaminophen (TYLENOL) tablet 650 mg Q4H PRN   vancomycin (VANCOCIN) 1,500 mg in dextrose 5 % 250 mL IVPB Q12H   cefepime (MAXIPIME) 2 g IVPB minibag Q8H   diltiazem (CARDIZEM) tablet 30 mg 4 times per day   azithromycin (ZITHROMAX) 500 mg in D5W 250ml Vial Mate Q24H       Objective:  /76   Pulse 74   Temp 98.8 °F (37.1 °C) (Oral)   Resp 18   Ht 5' 7\" (1.702 m)   Wt 228 lb (103.4 kg)   SpO2 93%   BMI 35.71 kg/m²     Intake/Output Summary (Last 24 hours) at 5/28/2019 0948  Last data filed at 5/28/2019 0038  Gross per 24 hour   Intake --   Output 825 ml   Net -825 ml    Wt Readings from Last 3 Encounters:   05/27/19 228 lb (103.4 kg)   04/01/19 228 lb 3.2 oz (103.5 kg)   10/22/18 228 lb 12.8 oz (103.8 kg)       General appearance:  Appears comfortable  Eyes: Sclera clear. Pupils equal.  ENT: Moist oral mucosa. Trachea midline, no adenopathy. Cardiovascular: Regular rhythm, normal S1, S2. No murmur. No edema in lower extremities  Respiratory: Not using accessory muscles. Good inspiratory effort. Clear to auscultation bilaterally, no wheeze or crackles.    GI: Abdomen soft, no tenderness, not distended  Musculoskeletal: No cyanosis in digits, neck supple  Neurology: CN 2-12 grossly intact. No speech or motor deficits  Psych: Normal affect. Alert and oriented in time, place and person  Skin: Warm, dry, normal turgor    Labs and Tests:  CBC:   Recent Labs     05/27/19  0838 05/28/19  0554   WBC 11.8* 9.0   HGB 12.1 10.4*    259     BMP:  Recent Labs     05/27/19  0839 05/28/19  0554    142   K 4.1 4.0    109   CO2 23 21   BUN 23* 17   CREATININE 1.1 0.9   GLUCOSE 106* 111*     Hepatic: Recent Labs     05/27/19  0839 05/28/19  0554   AST 13* 8*   ALT 16 15   BILITOT 0.4 <0.2   ALKPHOS 135* 122       ASSESSMENT AND PLAN    Active Problems:    Benign essential HTN    Pneumonia    Atrial fibrillation with RVR (HCC)  Resolved Problems:    * No resolved hospital problems.  *      Pneumonia   - continues on empiric antibiotics since she is immune compromised    CLL   - has done well with ibrutinib for 4 years  - WBC stable at 9.0  - hold while inpatient    Atrial fibrillation has been described as side effect of ibrutinib    Immunoglobulin levels are all low, She may have had a reaction to IVIG in past, will need to review old records.      Nataliia Garcia, [facility]  Oncology Hematology Care  Patient interviewed and examined with nurse practitioner agree with note above

## 2019-05-28 NOTE — PROGRESS NOTES
Physical Therapy    Facility/Department: [address] ONCOLOGY  Initial Assessment    NAME: Lesley Reynoso  : 1950  MRN: [id number]    Date of Service: 2019    Discharge Recommendations: Lesley Reynoso scored a 22/24 on the AM-PAC short mobility form. Current research shows that an AM-PAC score of 18 or greater is typically associated with a discharge to the patient's home setting. Based on the patients AM-PAC score and their current functional mobility deficits, it is recommended that the patient have 2-3 sessions per week of Physical Therapy at d/c to increase the patients independence. HOME HEALTH CARE: LEVEL 1 STANDARD    - Initial home health evaluation to occur within 24-48 hours, in patient home   - Therapy to evaluate with goal of regaining prior level of functioning   - Therapy to evaluate if patient has [address] needs for personal care    PT Equipment Recommendations  Equipment Needed: No    Assessment   Body structures, Functions, Activity limitations: Decreased functional mobility ; Decreased endurance;Decreased safe awareness;Decreased balance  Assessment: Pt presents as below her baseline function. Pt demonstrates mild instability of gait during ambulation, secondary to weakness and decreased mobility in connection to diagnosis of pneuonia. Pt would benefit from skilled PT services to promote safe return to PLOF. Treatment Diagnosis: Altered gait, impaired balance  Prognosis: Good  Decision Making: Low Complexity  Patient Education: POC, role of acute PT, discharge recommendations  REQUIRES PT FOLLOW UP: Yes  Activity Tolerance  Activity Tolerance: Patient Tolerated treatment well       Patient Diagnosis(es): The primary encounter diagnosis was Pneumonia of right lower lobe due to infectious organism ([facility] Diagnoses of Personal history of CLL (chronic lymphocytic leukemia) and Atrial fibrillation with RVR ([facility] were also pertinent to this visit.      has a past medical history of Anemia, CLL (chronic lymphocytic leukemia) ([facility] Hernia, Hypertension, and Leukemia ([facility] has a past surgical history that includes Hysterectomy; Varicose vein surgery; Ovary removal; and Appendectomy. Restrictions  Restrictions/Precautions  Restrictions/Precautions: Fall Risk(med fall risk)  Position Activity Restriction  Other position/activity restrictions: Lupe Vivar is a 77 y.o. female who presents to the emergency department with complaints of having low-grade fever over the past 1 week, states T-max 101°F.  Patient states that a few weeks ago she had her otis pulled up in her home and there was noted to be mold underneath. She states that she breathed in the mold. Was told by her oncologist to stay away from home. She does have a history of CLL. She is currently undergoing daily chemotherapy orally. Patient states that over the past several days she has felt very weak and fatigued. She did have a dose of acetaminophen around 3:00 this morning. She states that this does help with her symptoms. Patient feels like her lungs hurt and she feels fatigued. Denies any aggravating symptoms. She states that her cough is nonproductive however there is family at bedside who states that they heard her coughing last night and it sounded productive. Denies hemoptysis. Denies any other complaints including abdominal pain, nausea, vomiting, diarrhea or constipation. No urinary symptoms. She is Dr. Ildefonso Peacock for oncology.   Vision/Hearing  Vision: Within Functional Limits  Hearing: Within functional limits     Subjective  General  Chart Reviewed: Yes  Patient assessed for rehabilitation services?: Yes  Response To Previous Treatment: Not applicable  Family / Caregiver Present: No  Diagnosis: Pneumonia  Follows Commands: Within Functional Limits  General Comment  Comments: Pt supine in bed upon arrival.  Subjective  Subjective: Pt agreeable to PT/OT eval.  Pain Screening  Patient Currently in Strengthening, ROM, Balance Training, Functional Mobility Training, Transfer Training, Endurance Training, Gait Training, Stair training, Safety Education & Training, Patient/Caregiver Education & Training  Safety Devices  Type of devices: All fall risk precautions in place, Call light within reach, Gait belt, Chair alarm in place, Patient at risk for falls, Left in chair, Nurse notified  Restraints  Initially in place: No    G-Code       OutComes Score                                                  AM-PAC Score  AM-PAC Inpatient Mobility Raw Score : 22  AM-PAC Inpatient T-Scale Score : 53.28  Mobility Inpatient CMS 0-100% Score: 20.91  Mobility Inpatient CMS G-Code Modifier : CJ          Goals  Short term goals  Time Frame for Short term goals:  To be met prior to discharge  Short term goal 1: Pt will complete bed mobility with mod I  Short term goal 2: Pt will complete transfers with mod I  Short term goal 3: Pt will ambulate 300 ft with no AD and independence  Short term goal 4: Pt will navigate up/down 4 steps with HR and min A       Therapy Time   Individual Concurrent Group Co-treatment   Time In 1125         Time Out 1150         Minutes 25         Timed Code Treatment Minutes: 10 Minutes     Ladarius Coronado, PT   Ladarius Coronado, PT, DPT, [id number]

## 2019-05-28 NOTE — PROGRESS NOTES
Occupational/physical Therapy  Eloy Steve  Pt attempted, DEENA. Will re-attempt as schedule allows.   Thanks,  Jacquie Martinez, OTR/L [id number]       Sonia Veronica, [facility], DPT [id number]

## 2019-05-28 NOTE — CONSULTS
[facility]   Electrophysiology Consultation   Date: 5/28/2019  Reason for Consultation: Atrial fibrillation   Consult Requesting Physician: Wili Campos MD     Chief Complaint   Patient presents with    Fever     pt reporting that about 2 weeks pt had otis pulled up with noted mold under, starting 1 week ago started with fever , pt with hx of leukemia, pts friend would like checked for pnuemonia, pt does state that her lungs hurt and she feels weak. HPI: Bill Edwards is a 77 y.o. female on chemo for CLL . She has had cough and shortness of breath for a few days after her floor was replaced and exposed to mold. She has had episodes of palpitations for a few weeks lasting a few minutes and several times a day. She was in Atrial fibrillation and rapid ventricular response at admission but went to sinus around 7:30 pm yesterday. She was diagnosed with pneumonia        Past Medical History:   Diagnosis Date    Anemia     CLL (chronic lymphocytic leukemia) ([facility]     Hernia     Hypertension     Leukemia ([facility]         Past Surgical History:   Procedure Laterality Date    APPENDECTOMY      HYSTERECTOMY      OVARY REMOVAL      VARICOSE VEIN SURGERY         Allergies   Allergen Reactions    Amoxicillin     Lisinopril Other (See Comments)     States it caused depression     Molds & Smuts     Penicillins        Social History:  Reviewed. reports that she quit smoking about 3 years ago. Her smoking use included cigarettes. She started smoking about 51 years ago. She smoked 0.05 packs per day for 0.00 years. She has never used smokeless tobacco. She reports that she does not drink alcohol or use drugs. Family History:  Reviewed. family history includes Allergy (Severe) in her mother; Cancer in her father and mother; Mental Illness in her sister. Review of System:  All other systems reviewed and are negative except for that noted above.  Pertinent negatives are:     · General: negative for fever, chills   · Ophthalmic ROS: negative for - eye pain or loss of vision  · ENT ROS: negative for - headaches, sore throat   · Respiratory: negative for - cough, sputum  · Cardiovascular: Reviewed in HPI  · Gastrointestinal: negative for - abdominal pain, diarrhea, N/V  · Hematology: negative for - bleeding, blood clots, bruising or jaundice  · Genito-Urinary:  negative for - Dysuria or incontinence  · Musculoskeletal: negative for - Joint swelling, muscle pain  · Neurological: negative for - confusion, dizziness, headaches   · Psychiatric: No anxiety, no depression. · Dermatological: negative for - rash    Physical Examination:  Vitals:    19 0616   BP: 101/60   Pulse:    Resp:    Temp:    SpO2:       In: -   Out: 825    Wt Readings from Last 3 Encounters:   19 228 lb (103.4 kg)   19 228 lb 3.2 oz (103.5 kg)   10/22/18 228 lb 12.8 oz (103.8 kg)     Temp  Av.8 °F (37.1 °C)  Min: 97.6 °F (36.4 °C)  Max: 100.1 °F (37.8 °C)  Pulse  Av.8  Min: 73  Max: 136  BP  Min: 101/60  Max: 125/71  SpO2  Av.9 %  Min: 91 %  Max: 94 %    Intake/Output Summary (Last 24 hours) at 2019 0813  Last data filed at 2019 0038  Gross per 24 hour   Intake --   Output 825 ml   Net -825 ml       · Telemetry: Sinus rhythm   · Constitutional: Oriented. No distress. · Head: Normocephalic and atraumatic. · Mouth/Throat: Oropharynx is clear and moist.   · Eyes: Conjunctivae normal. EOM are normal.   · Neck: Neck supple. No rigidity. No JVD present. · Cardiovascular: Normal rate, regular rhythm, S1&S2. · Pulmonary/Chest: Bilateral respiratory sounds. No wheezes, No rhonchi. crackles  · Abdominal: Soft. Bowel sounds present. No distension, No tenderness. · Musculoskeletal: No tenderness. No edema    · Lymphadenopathy: Has no cervical adenopathy. · Neurological: Alert and oriented. Cranial nerve appears intact, No Gross deficit   · Skin: Skin is warm and dry. No rash noted. · Psychiatric: Has a normal behavior     Labs, diagnostic and imaging results reviewed. Reviewed. Recent Labs     05/27/19  0839 05/28/19  0554    142   K 4.1 4.0    109   CO2 23 21   BUN 23* 17   CREATININE 1.1 0.9     Recent Labs     05/27/19  0838 05/28/19  0554   WBC 11.8* 9.0   HGB 12.1 10.4*   HCT 37.1 31.8*   MCV 85.4 85.5    259     Lab Results   Component Value Date    TROPONINI 0.03 12/27/2011     Lab Results   Component Value Date    BNP <15 12/27/2011     Lab Results   Component Value Date    PROTIME 13.5 05/27/2019    INR 1.18 05/27/2019     Lab Results   Component Value Date    CHOL 319 10/02/2018    HDL 65 10/02/2018    TRIG 226 10/02/2018       ECG: Atrial fibrillation with rapid ventricular response 134  Poor data quality, interpretation may be adversely affectedNonspecific ST and T wave   Echo: Conclusions      Summary   Normal left ventricle size and systolic function with an estimated ejection   fraction of 65-70%.   There is mild concentric left ventricular hypertrophy.   Mild mitral regurgitation.   Mild tricuspid regurgitation with PASP of 35-40 mmHg.   There is a small circumferential pericardial effusion noted.     Cath:   CXR  New focal consolidation at the right lung base.  Pneumonia is a primary   consideration.  Asymmetric pulmonary edema is also considered.  Clinical   correlation is recommended.        Scheduled Meds:   vitamin C  500 mg Oral Daily    rosuvastatin  10 mg Oral Nightly    sodium chloride flush  10 mL Intravenous 2 times per day    enoxaparin  40 mg Subcutaneous Daily    vancomycin  15 mg/kg Intravenous Q12H    cefepime  2 g Intravenous Q8H    diltiazem  30 mg Oral 4 times per day    azithromycin  500 mg Intravenous Q24H     Continuous Infusions:   sodium chloride 75 mL/hr at 05/28/19 0617     PRN Meds:.sodium chloride flush, magnesium hydroxide, ondansetron, albuterol, acetaminophen     Patient Active Problem List    Diagnosis Date

## 2019-05-28 NOTE — PROGRESS NOTES
[facility] PROGRESS NOTE    5/28/2019 8:44 AM        Name: Geraldine Sanchez . Admitted: 5/27/2019  Primary Care Provider: Nadia Raya DO (Tel: [phone])      Subjective:  . Seen in the afternoon hours after working with therapy  States she is \"exhausted\"  No cough  Reports no current dyspnea since out of atrial fib ( pt converted on her own)    Reviewed interval ancillary notes    Current Medications    vitamin C (ASCORBIC ACID) tablet 500 mg Daily   rosuvastatin (CRESTOR) tablet 10 mg Nightly   sodium chloride flush 0.9 % injection 10 mL 2 times per day   sodium chloride flush 0.9 % injection 10 mL PRN   magnesium hydroxide (MILK OF MAGNESIA) 400 MG/5ML suspension 30 mL Daily PRN   ondansetron (ZOFRAN) injection 4 mg Q6H PRN   enoxaparin (LOVENOX) injection 40 mg Daily   0.9 % sodium chloride infusion Continuous   albuterol (PROVENTIL) nebulizer solution 2.5 mg Q2H PRN   acetaminophen (TYLENOL) tablet 650 mg Q4H PRN   vancomycin (VANCOCIN) 1,500 mg in dextrose 5 % 250 mL IVPB Q12H   cefepime (MAXIPIME) 2 g IVPB minibag Q8H   diltiazem (CARDIZEM) tablet 30 mg 4 times per day   azithromycin (ZITHROMAX) 500 mg in D5W 250ml Vial Mate Q24H       Objective:  /76   Pulse 74   Temp 98.8 °F (37.1 °C) (Oral)   Resp 18   Ht 5' 7\" (1.702 m)   Wt 228 lb (103.4 kg)   SpO2 93%   BMI 35.71 kg/m²     Intake/Output Summary (Last 24 hours) at 5/28/2019 0844  Last data filed at 5/28/2019 0038  Gross per 24 hour   Intake --   Output 825 ml   Net -825 ml    Wt Readings from Last 3 Encounters:   05/27/19 228 lb (103.4 kg)   04/01/19 228 lb 3.2 oz (103.5 kg)   10/22/18 228 lb 12.8 oz (103.8 kg)       General appearance:  Appears comfortable, she is alert and talkative   Eyes: Sclera clear. Pupils equal.,bruise noted beneath right eye   ENT: Moist oral mucosa. Trachea midline, no adenopathy.   Cardiovascular: Regular rhythm, normal S1, S2. No murmur. No edema in lower extremities  Respiratory: Not using accessory muscles. No increased work of breathing. Lungs are clear   GI: Abdomen soft, no tenderness, not distended, normal bowel sounds  Musculoskeletal: No cyanosis in digits, neck supple  Neurology: CN 2-12 grossly intact. No speech or motor deficits  Psych: Normal affect. Alert and oriented in time, place and person  Skin: Warm, dry, normal turgor    Labs and Tests:  CBC:   Recent Labs     05/27/19  0838 05/28/19  0554   WBC 11.8* 9.0   HGB 12.1 10.4*    259     BMP:  Recent Labs     05/27/19  0839 05/28/19  0554    142   K 4.1 4.0    109   CO2 23 21   BUN 23* 17   CREATININE 1.1 0.9   GLUCOSE 106* 111*     Hepatic: Recent Labs     05/27/19  0839 05/28/19  0554   AST 13* 8*   ALT 16 15   BILITOT 0.4 <0.2   ALKPHOS 135* 122           Problem List  Active Problems:    Benign essential HTN    Pneumonia    Atrial fibrillation with RVR (HCC)  Resolved Problems:    * No resolved hospital problems. *       Assessment & Plan:   1. PNA:  On broad spectrum antibiotics in light of immunocompromised state from her chemo. Clinically she looks good and has been weaned from oxygen. Will de escalate antibiotics in the am  2. Previous atrial fib with RVR: now in SR,  EP following , will have loop recorder inserted,  If recurrent atrial fib then will likely benefit from [facility],  Pt converted on her own, now on oral cardizem   3. CLL:  Oncology following,  ibrutinib d/c and may be responsible for her AF  4. HTN:  BP at goal   5. Ambulation encouraged  6.  Anticipate d/c home in the next 24 - 36 hours       Diet: DIET GENERAL;  Code:Full Code  DVT PPX      MARTIN Palmer - CNP   5/28/2019 8:44 AM

## 2019-05-29 VITALS
TEMPERATURE: 98.3 F | SYSTOLIC BLOOD PRESSURE: 126 MMHG | BODY MASS INDEX: 35.79 KG/M2 | OXYGEN SATURATION: 95 % | HEART RATE: 76 BPM | RESPIRATION RATE: 18 BRPM | DIASTOLIC BLOOD PRESSURE: 69 MMHG | HEIGHT: 67 IN | WEIGHT: 228 LBS

## 2019-05-29 LAB
ALBUMIN SERPL-MCNC: 3 G/DL (ref 3.4–5)
ANION GAP SERPL CALCULATED.3IONS-SCNC: 13 MMOL/L (ref 3–16)
BASOPHILS ABSOLUTE: 0.1 K/UL (ref 0–0.2)
BASOPHILS RELATIVE PERCENT: 0.9 %
BUN BLDV-MCNC: 13 MG/DL (ref 7–20)
CALCIUM SERPL-MCNC: 8.5 MG/DL (ref 8.3–10.6)
CHLORIDE BLD-SCNC: 108 MMOL/L (ref 99–110)
CO2: 21 MMOL/L (ref 21–32)
CREAT SERPL-MCNC: 0.8 MG/DL (ref 0.6–1.2)
EOSINOPHILS ABSOLUTE: 0.1 K/UL (ref 0–0.6)
EOSINOPHILS RELATIVE PERCENT: 1.4 %
GFR AFRICAN AMERICAN: >60
GFR NON-AFRICAN AMERICAN: >60
GLUCOSE BLD-MCNC: 105 MG/DL (ref 70–99)
HCT VFR BLD CALC: 35 % (ref 36–48)
HEMOGLOBIN: 11.5 G/DL (ref 12–16)
HISTOPLASMA ANTIGEN URINE INTERP: NOT DETECTED
HISTOPLASMA ANTIGEN URINE: NOT DETECTED NG/ML
LYMPHOCYTES ABSOLUTE: 0.7 K/UL (ref 1–5.1)
LYMPHOCYTES RELATIVE PERCENT: 7.7 %
MCH RBC QN AUTO: 27.9 PG (ref 26–34)
MCHC RBC AUTO-ENTMCNC: 32.8 G/DL (ref 31–36)
MCV RBC AUTO: 85.1 FL (ref 80–100)
MONOCYTES ABSOLUTE: 0.8 K/UL (ref 0–1.3)
MONOCYTES RELATIVE PERCENT: 8.9 %
NEUTROPHILS ABSOLUTE: 7.4 K/UL (ref 1.7–7.7)
NEUTROPHILS RELATIVE PERCENT: 81.1 %
PDW BLD-RTO: 15.1 % (ref 12.4–15.4)
PHOSPHORUS: 3.6 MG/DL (ref 2.5–4.9)
PLATELET # BLD: 274 K/UL (ref 135–450)
PMV BLD AUTO: 9.7 FL (ref 5–10.5)
POTASSIUM SERPL-SCNC: 3.8 MMOL/L (ref 3.5–5.1)
RBC # BLD: 4.12 M/UL (ref 4–5.2)
SODIUM BLD-SCNC: 142 MMOL/L (ref 136–145)
VANCOMYCIN TROUGH: 20.5 UG/ML (ref 10–20)
WBC # BLD: 9.1 K/UL (ref 4–11)

## 2019-05-29 PROCEDURE — 85025 COMPLETE CBC W/AUTO DIFF WBC: CPT

## 2019-05-29 PROCEDURE — 36415 COLL VENOUS BLD VENIPUNCTURE: CPT

## 2019-05-29 PROCEDURE — 80202 ASSAY OF VANCOMYCIN: CPT

## 2019-05-29 PROCEDURE — 6370000000 HC RX 637 (ALT 250 FOR IP): Performed by: INTERNAL MEDICINE

## 2019-05-29 PROCEDURE — 6360000002 HC RX W HCPCS: Performed by: INTERNAL MEDICINE

## 2019-05-29 PROCEDURE — 80069 RENAL FUNCTION PANEL: CPT

## 2019-05-29 PROCEDURE — 99233 SBSQ HOSP IP/OBS HIGH 50: CPT | Performed by: INTERNAL MEDICINE

## 2019-05-29 PROCEDURE — 2580000003 HC RX 258: Performed by: INTERNAL MEDICINE

## 2019-05-29 PROCEDURE — 94760 N-INVAS EAR/PLS OXIMETRY 1: CPT

## 2019-05-29 RX ORDER — DILTIAZEM HYDROCHLORIDE 120 MG/1
120 CAPSULE, COATED, EXTENDED RELEASE ORAL DAILY
Status: DISCONTINUED | OUTPATIENT
Start: 2019-05-29 | End: 2019-05-29 | Stop reason: HOSPADM

## 2019-05-29 RX ORDER — LEVOFLOXACIN 500 MG/1
500 TABLET, FILM COATED ORAL DAILY
Qty: 7 TABLET | Refills: 0 | Status: SHIPPED | OUTPATIENT
Start: 2019-05-29 | End: 2019-06-05

## 2019-05-29 RX ORDER — DILTIAZEM HYDROCHLORIDE 120 MG/1
120 CAPSULE, COATED, EXTENDED RELEASE ORAL DAILY
Qty: 30 CAPSULE | Refills: 3 | Status: SHIPPED | OUTPATIENT
Start: 2019-05-29 | End: 2019-07-01 | Stop reason: SDUPTHER

## 2019-05-29 RX ADMIN — CEFEPIME HYDROCHLORIDE 2 G: 2 INJECTION, POWDER, FOR SOLUTION INTRAVENOUS at 09:30

## 2019-05-29 RX ADMIN — Medication 10 ML: at 09:30

## 2019-05-29 RX ADMIN — ENOXAPARIN SODIUM 40 MG: 40 INJECTION SUBCUTANEOUS at 09:31

## 2019-05-29 RX ADMIN — OXYCODONE HYDROCHLORIDE AND ACETAMINOPHEN 500 MG: 500 TABLET ORAL at 09:30

## 2019-05-29 RX ADMIN — CEFEPIME HYDROCHLORIDE 2 G: 2 INJECTION, POWDER, FOR SOLUTION INTRAVENOUS at 02:35

## 2019-05-29 RX ADMIN — DILTIAZEM HYDROCHLORIDE 30 MG: 30 TABLET, FILM COATED ORAL at 00:26

## 2019-05-29 RX ADMIN — VANCOMYCIN HYDROCHLORIDE 1500 MG: 10 INJECTION, POWDER, LYOPHILIZED, FOR SOLUTION INTRAVENOUS at 00:26

## 2019-05-29 RX ADMIN — DILTIAZEM HYDROCHLORIDE 30 MG: 30 TABLET, FILM COATED ORAL at 05:32

## 2019-05-29 RX ADMIN — DILTIAZEM HYDROCHLORIDE 120 MG: 120 CAPSULE, COATED, EXTENDED RELEASE ORAL at 13:06

## 2019-05-29 RX ADMIN — SODIUM CHLORIDE: 9 INJECTION, SOLUTION INTRAVENOUS at 05:33

## 2019-05-29 ASSESSMENT — PAIN SCALES - GENERAL
PAINLEVEL_OUTOF10: 0

## 2019-05-29 NOTE — PROGRESS NOTES
Bedside rounding with Montey Goodell, RN. Pt resting in bed with eyes closed, respirations even and unlabored. Call light noted to be resting in pt's lap. All needs appear within reach. White board updated. Will continue to monitor.  Electronically signed by Tessa Kilpatrick RN on 5/29/2019 at 7:28 AM

## 2019-05-29 NOTE — PROGRESS NOTES
Spoke to Dr. Isabel Daniel. Okay to be discharged. Hold Ibrutinib until f/u with Dr. Camillia Aschoff in 1-2 weeks.  Electronically signed by Cody Peña RN on 5/29/2019 at 12:26 PM

## 2019-05-29 NOTE — PROGRESS NOTES
Head to toe assessment complete. Vital signs obtained. Pt resting in bed at this time. AM medication administered. Pt tolerated well. Pt denies pain. Call light in hand. Pt verbalizes correct use. Will continue to monitor.  Electronically signed by William Velazquez RN on 5/29/2019 at 9:41 AM

## 2019-05-29 NOTE — PROGRESS NOTES
[facility]   Electrophysiology Progress Note     Admit Date: 2019     Reason for follow up: Atrial fibrillation     HPI and Interval History:   Patient seen and examined. Clinical notes reviewed. Telemetry reviewed. No new complaint today. No major events overnight. Denies having chest pain, shortness of breath, dyspnea on exertion, Orthopnea, PND at the time of this visit. No Atrial fibrillation   Review of System:  All other systems reviewed and are negative except for that noted above. Pertinent negatives are:     · General: negative for fever, chills   · Ophthalmic ROS: negative for - eye pain or loss of vision  · ENT ROS: negative for - headaches, sore throat   · Respiratory: negative for - cough, sputum  · Cardiovascular: Reviewed in HPI  · Gastrointestinal: negative for - abdominal pain, diarrhea, N/V  · Hematology: negative for - bleeding, blood clots, bruising or jaundice  · Genito-Urinary:  negative for - Dysuria or incontinence  · Musculoskeletal: negative for - Joint swelling, muscle pain  · Neurological: negative for - confusion, dizziness, headaches   · Psychiatric: No anxiety, no depression. · Dermatological: negative for - rash      Physical Examination:  Vitals:    19 0528   BP: 125/69   Pulse: 87   Resp: 18   Temp: 98.7 °F (37.1 °C)   SpO2: 94%      In: 1500.4 [P.O.:360; I.V.:1140.4]  Out: -    Wt Readings from Last 3 Encounters:   19 228 lb (103.4 kg)   19 228 lb 3.2 oz (103.5 kg)   10/22/18 228 lb 12.8 oz (103.8 kg)     Temp  Av.3 °F (36.8 °C)  Min: 98 °F (36.7 °C)  Max: 98.7 °F (37.1 °C)  Pulse  Av  Min: 75  Max: 93  BP  Min: 115/63  Max: 168/87  SpO2  Av.3 %  Min: 90 %  Max: 95 %    Intake/Output Summary (Last 24 hours) at 2019 0986  Last data filed at 2019 0235  Gross per 24 hour   Intake 1740.38 ml   Output --   Net 1740.38 ml       · Telemetry: Sinus rhythm   · Constitutional: Oriented. No distress.  obese  · Head: Normocephalic and atraumatic. · Mouth/Throat: Oropharynx is clear and moist.   · Eyes: Conjunctivae normal. EOM are normal.   · Neck: Neck supple. No rigidity. No JVD present. · Cardiovascular: Normal rate, regular rhythm, S1&S2. · Pulmonary/Chest: Bilateral respiratory sounds. No wheezes, No rhonchi. Site ok  · Abdominal: Soft. Bowel sounds present. No distension, No tenderness. · Musculoskeletal: No tenderness. No edema    · Lymphadenopathy: Has no cervical adenopathy. · Neurological: Alert and oriented. Cranial nerve appears intact, No Gross deficit   · Skin: Skin is warm and dry. No rash noted. · Psychiatric: Has a normal behavior     Labs, diagnostic and imaging results reviewed. Reviewed. Recent Labs     05/27/19  0839 05/28/19  0554 05/29/19  0555    142 142   K 4.1 4.0 3.8    109 108   CO2 23 21 21   PHOS  --   --  3.6   BUN 23* 17 13   CREATININE 1.1 0.9 0.8     Recent Labs     05/27/19  0838 05/28/19  0554 05/29/19  0555   WBC 11.8* 9.0 9.1   HGB 12.1 10.4* 11.5*   HCT 37.1 31.8* 35.0*   MCV 85.4 85.5 85.1    259 274     Lab Results   Component Value Date    TROPONINI 0.03 12/27/2011     Estimated Creatinine Clearance: 81 mL/min (based on SCr of 0.8 mg/dL).    Lab Results   Component Value Date    BNP <15 12/27/2011     Lab Results   Component Value Date    PROTIME 13.5 05/27/2019    INR 1.18 05/27/2019     Lab Results   Component Value Date    CHOL 319 10/02/2018    HDL 65 10/02/2018    TRIG 226 10/02/2018       Scheduled Meds:   vitamin C  500 mg Oral Daily    rosuvastatin  10 mg Oral Nightly    sodium chloride flush  10 mL Intravenous 2 times per day    enoxaparin  40 mg Subcutaneous Daily    vancomycin  15 mg/kg Intravenous Q12H    cefepime  2 g Intravenous Q8H    diltiazem  30 mg Oral 4 times per day    azithromycin  500 mg Intravenous Q24H     Continuous Infusions:   sodium chloride 75 mL/hr at 05/29/19 0533     PRN Meds:sodium chloride flush, magnesium hydroxide, ondansetron, albuterol, acetaminophen     Patient Active Problem List    Diagnosis Date Noted    Atrial fibrillation with RVR ([facility]     Pneumonia 05/27/2019    Hyperlipidemia LDL goal <100 04/01/2019    Hyperglycemia 04/01/2019    Atypical chest pain 04/01/2019    Seasonal allergies 10/22/2018    Impacted cerumen, right ear 10/02/2018    Vaccine counseling 10/02/2018    Benign essential HTN 04/02/2018    Weight gain 04/02/2018    Vitamin D insufficiency 04/02/2018    Hypogammaglobulinemia ([facility] 12/07/2016    Obesity (BMI 35.0-39.9 without comorbidity) 09/16/2016    CLL (chronic lymphocytic leukemia) ([facility] 09/28/2015      Active Hospital Problems    Diagnosis Date Noted    Atrial fibrillation with RVR ([facility] [I48.91]     Pneumonia [J18.9] 05/27/2019    Benign essential HTN [I10] 04/02/2018       Assessment:       Plan:   - Atrial fibrillation with3 rapid ventricular response              Self converted. No recurrence              Can be due to infection or a new problem or side effect of ibrutinib. s/p Implantable Loop recorder and monitor and if recurrent, will need anticoagulation due to [id number] score of 3        - Pneumonia              On antibiotics        - CLL              On Ibrutinib and stable              On hold     - HTN              BP is well controlled. Continue current meds. change cardizem to  mg qd        From EP standpoint he can be discharged with 1 week f/u in device clinic and 2-3 months with me          NOTE: This report was transcribed using voice recognition software. Every effort was made to ensure accuracy, however, inadvertent computerized transcription errors may be present.

## 2019-05-29 NOTE — CARE COORDINATION
Patient discharged 05/29/19 to home. Met w/pt regarding PT/OT recommendations for home care. Pt declined home care-informed pt that PCP can order for pt, should she change her mind. All discharge needs met per case management.   Electronically signed by JACEY Jones on 5/29/2019 at 12:41 PM

## 2019-05-29 NOTE — DISCHARGE SUMMARY
[facility]    Patient Demographics    Patient. Lesley Reynoso  Date of Birth. 1950  MRN. [id number]     Primary care provider. [facility],   (Tel: [phone])    Admit date: 5/27/2019    Discharge date 5/29/2019  Note Date: 5/29/2019     Reason for Hospitalization. Chief Complaint   Patient presents with    Fever     pt reporting that about 2 weeks pt had otis pulled up with noted mold under, starting 1 week ago started with fever , pt with hx of leukemia, pts friend would like checked for pnuemonia, pt does state that her lungs hurt and she feels weak. Significant Findings. Active Problems:    Benign essential HTN    Pneumonia    Atrial fibrillation with RVR ([facility]  Resolved Problems:    * No resolved hospital problems. *       Problems and results from this hospitalization that need follow up. 1. CLL:  Follow up with oncology   2. PAF:  Has loop recorder,  Follow with EP  3. Needs follow up chest xray     Significant test results and incidental findings. New focal consolidation at the right lung base.  Pneumonia is a primary  consideration.  Asymmetric pulmonary edema is also considered.  Clinical  correlation is recommended. Recommend follow-up two-view chest x-ray in 2-4 weeks time following  treatment to ensure resolution.         Invasive procedures and treatments. 1. Loop recorder inserted on 5/28/2019    [facility] Course. The patient was admitted through the ED with reports of generalized weakness and fever. She was found to be in atrial fib with RVR,  Chest xray also suggested PNA. She was admitted and treated for the following:      PNA:  she received  broad spectrum antibiotics in light of immunocompromised state from her chemo during her stay.   She was easily weaned from oxygen and transitioned to levaquin at time of discharge

## 2019-05-30 PROBLEM — Z45.09 ENCOUNTER FOR ELECTRONIC ANALYSIS OF REVEAL EVENT RECORDER: Status: ACTIVE | Noted: 2019-05-30

## 2019-05-30 NOTE — PROGRESS NOTES
Occupational Therapy  Mortimer Rice  Occupational Therapy Discharge Summary    Name: Mortimer Rice  : 1950    The pt was evaluated by OT on 19 and seen for 0 treatment sessions prior to DC to home on 19 per MD order. The pt's acute therapy goals were:  Short term goals  Time Frame for Short term goals: discharge  Short term goal 1: bed mobility mod I  Short term goal 2: functional ADL transfer mod I  Short term goal 3: functional mobility with RW mod I  Short term goal 4: UB/LB ADLs mod I  Short term goal 5: grooming in stance at sink mod I        Patient met 0 goals during stay. Number of Refusals:0  Number of Holds: 1  During this hospitalization, the patient was educated on:  Patient Education: OT eval, POC, discharge, safety awareness, cues for impulsivity    DC pt from OT caseload at this time. Thank you!     Nam Dickens OTR/L [id number]

## 2019-06-01 LAB — CULTURE, BLOOD 2: NORMAL

## 2019-06-02 LAB — BLOOD CULTURE, ROUTINE: NORMAL

## 2019-06-04 ENCOUNTER — PROCEDURE VISIT (OUTPATIENT)
Dept: CARDIOLOGY CLINIC | Age: 69
End: 2019-06-04
Payer: MEDICARE

## 2019-06-04 DIAGNOSIS — Z45.09 ENCOUNTER FOR ELECTRONIC ANALYSIS OF REVEAL EVENT RECORDER: Primary | ICD-10-CM

## 2019-06-04 DIAGNOSIS — I48.91 ATRIAL FIBRILLATION WITH RVR (HCC): ICD-10-CM

## 2019-06-04 PROCEDURE — 93291 INTERROG DEV EVAL SCRMS IP: CPT | Performed by: INTERNAL MEDICINE

## 2019-06-04 NOTE — PROGRESS NOTES
Patient comes in for interrogation of her implanted loop recorder. Her incision is healing nicely. Interrogation shows AF. She is not on anti coags. Moved appt up with Dr. Claudell Spring to discuss.

## 2019-06-06 ENCOUNTER — HOSPITAL ENCOUNTER (OUTPATIENT)
Age: 69
Discharge: HOME | End: 2019-06-06
Payer: MEDICARE

## 2019-06-06 ENCOUNTER — HOSPITAL ENCOUNTER (OUTPATIENT)
Dept: GENERAL RADIOLOGY | Age: 69
Discharge: HOME | End: 2019-06-06
Payer: MEDICARE

## 2019-06-06 DIAGNOSIS — C91.10 CHRONIC LYMPHOCYTIC LEUKEMIA (CLL) GENETIC MUTATION VARIANT (HCC): ICD-10-CM

## 2019-06-06 PROCEDURE — 71046 X-RAY EXAM CHEST 2 VIEWS: CPT

## 2019-06-21 ENCOUNTER — TELEPHONE (OUTPATIENT)
Dept: CARDIOLOGY CLINIC | Age: 69
End: 2019-06-21

## 2019-06-21 NOTE — TELEPHONE ENCOUNTER
Patient called to inform us that she will need to cancel her appointment and reschedule for a later time. appt moved to Sept 26.

## 2019-07-01 ENCOUNTER — OFFICE VISIT (OUTPATIENT)
Dept: INTERNAL MEDICINE CLINIC | Age: 69
End: 2019-07-01
Payer: MEDICARE

## 2019-07-01 VITALS
BODY MASS INDEX: 34.8 KG/M2 | HEART RATE: 80 BPM | SYSTOLIC BLOOD PRESSURE: 130 MMHG | DIASTOLIC BLOOD PRESSURE: 94 MMHG | OXYGEN SATURATION: 97 % | WEIGHT: 222.2 LBS

## 2019-07-01 DIAGNOSIS — C91.10 CLL (CHRONIC LYMPHOCYTIC LEUKEMIA) (HCC): ICD-10-CM

## 2019-07-01 DIAGNOSIS — I48.0 PAROXYSMAL A-FIB (HCC): ICD-10-CM

## 2019-07-01 DIAGNOSIS — E55.9 VITAMIN D INSUFFICIENCY: ICD-10-CM

## 2019-07-01 DIAGNOSIS — E78.5 HYPERLIPIDEMIA LDL GOAL <100: ICD-10-CM

## 2019-07-01 DIAGNOSIS — J30.2 SEASONAL ALLERGIES: ICD-10-CM

## 2019-07-01 DIAGNOSIS — J18.9 PNEUMONIA DUE TO INFECTIOUS ORGANISM, UNSPECIFIED LATERALITY, UNSPECIFIED PART OF LUNG: ICD-10-CM

## 2019-07-01 DIAGNOSIS — I10 BENIGN ESSENTIAL HTN: Primary | ICD-10-CM

## 2019-07-01 PROCEDURE — 3017F COLORECTAL CA SCREEN DOC REV: CPT | Performed by: INTERNAL MEDICINE

## 2019-07-01 PROCEDURE — G8417 CALC BMI ABV UP PARAM F/U: HCPCS | Performed by: INTERNAL MEDICINE

## 2019-07-01 PROCEDURE — 99214 OFFICE O/P EST MOD 30 MIN: CPT | Performed by: INTERNAL MEDICINE

## 2019-07-01 PROCEDURE — 4040F PNEUMOC VAC/ADMIN/RCVD: CPT | Performed by: INTERNAL MEDICINE

## 2019-07-01 PROCEDURE — G8400 PT W/DXA NO RESULTS DOC: HCPCS | Performed by: INTERNAL MEDICINE

## 2019-07-01 PROCEDURE — 1036F TOBACCO NON-USER: CPT | Performed by: INTERNAL MEDICINE

## 2019-07-01 PROCEDURE — 1090F PRES/ABSN URINE INCON ASSESS: CPT | Performed by: INTERNAL MEDICINE

## 2019-07-01 PROCEDURE — G8427 DOCREV CUR MEDS BY ELIG CLIN: HCPCS | Performed by: INTERNAL MEDICINE

## 2019-07-01 PROCEDURE — 1123F ACP DISCUSS/DSCN MKR DOCD: CPT | Performed by: INTERNAL MEDICINE

## 2019-07-01 RX ORDER — ERGOCALCIFEROL 1.25 MG/1
50000 CAPSULE ORAL WEEKLY
Qty: 12 CAPSULE | Refills: 1 | Status: SHIPPED | OUTPATIENT
Start: 2019-07-01 | End: 2020-10-01

## 2019-07-01 RX ORDER — ROSUVASTATIN CALCIUM 10 MG/1
10 TABLET, COATED ORAL DAILY
Qty: 90 TABLET | Refills: 3 | Status: SHIPPED | OUTPATIENT
Start: 2019-07-01 | End: 2019-09-26 | Stop reason: ALTCHOICE

## 2019-07-01 RX ORDER — DILTIAZEM HYDROCHLORIDE 120 MG/1
120 CAPSULE, COATED, EXTENDED RELEASE ORAL DAILY
Qty: 90 CAPSULE | Refills: 3 | Status: SHIPPED | OUTPATIENT
Start: 2019-07-01 | End: 2020-03-30 | Stop reason: SDUPTHER

## 2019-07-01 ASSESSMENT — ENCOUNTER SYMPTOMS
DIARRHEA: 0
CONSTIPATION: 0
CHEST TIGHTNESS: 0
SHORTNESS OF BREATH: 0

## 2019-07-01 NOTE — PROGRESS NOTES
Patient: Luis Angel Anglin is a 77 y.o. female who presents today with the following Chief Complaint(s):  Chief Complaint   Patient presents with    3 Month Follow-Up       HPI     Admitted to [facility] 5/27-5/29/19 with pneumonia. Was found to have right pleural effusion on CXR. Had f/u on 6/6 with some improvement. Has f/u appointment with Dr. Akhil Alfredo later this month (7/23/19). Was also found to be in a.fib with RVR. Was seen by Dr. Brittany Howard in the hospital and had loop recorder inserted. She was not started on anticoagulation at that time as her a.fib was not persistent. Has f/u appointment with Dr. Brittany Howard in September. Did have interrogation of her Loop Recorder and was found have episodes of a.fib. Has felt palpitations off and on over the past several years. Had not felt any palpitations since stopping Imbruvica. A.fib was felt to be related to pneumonia, possibly Imbruvica. Thedora Ask was discontinued in the hospital.   Did see Dr. Donita Johns last week on Thursday and was told that she is in remission. WBC 7.8. Remains off of Imbruvica for now. Has stopped smoking! Has not wanted to smoke. Is feeling better physically than she has in months. Has been out of her vitamin D- needs a refill. Has been forgetting to take her Crestor since she has been ill. Has had a slight dry cough. Does not feel ill. Not taking Allegra. No fevers. No ST. No SOB. No sinus congestion. [id number] Score for Atrial Fibrillation Stroke Risk   Risk   Factors  Component Value   C CHF No 0   H HTN Yes 1   A2 Age >= 76 No,  (67 y.o.) 0   D DM No 0   S2 Prior Stroke/TIA No 0   V Vascular Disease No 0   A Age 74-69 Yes,  (67 y.o.) 1   Sc Sex female 1    [id number]  Score  3   Score last updated 7/1/19 11:08 AM    3.2% per year stroke risk.   Click here for a link to the UpToDate guideline \"Atrial Fibrillation: Anticoagulation therapy to prevent embolization    Disclaimer: Risk Score calculation is dependent on accuracy of patient problem list and past encounter diagnosis.       Allergies   Allergen Reactions    Amoxicillin     Lisinopril Other (See Comments)     States it caused depression     Molds & Smuts     Penicillins       Past Medical History:   Diagnosis Date    Anemia     [facility] [address]     Hernia     Hypertension     Leukemia ([facility] [address]       Past Surgical History:   Procedure Laterality Date    APPENDECTOMY      HYSTERECTOMY      OVARY REMOVAL      VARICOSE VEIN SURGERY        Social History     Socioeconomic History    Marital status: Single     Spouse name: Not on file    Number of children: Not on file    Years of education: Not on file    Highest education level: Not on file   Occupational History    Not on file   Social Needs    Financial resource strain: Not on file    Food insecurity:     Worry: Not on file     Inability: Not on file    Transportation needs:     Medical: Not on file     Non-medical: Not on file   Tobacco Use    Smoking status: Former Smoker     Packs/day: 0.05     Years: 0.00     Pack years: 0.00     Types: Cigarettes     Start date: 1968     Last attempt to quit: 2016     Years since quitting: 3.5    Smokeless tobacco: Never Used    Tobacco comment: less than 1 pack per week at end; never over 1ppd   Substance and Sexual Activity    Alcohol use: No    Drug use: No    Sexual activity: Not on file   Lifestyle    Physical activity:     Days per week: Not on file     Minutes per session: Not on file    Stress: Not on file   Relationships    Social connections:     Talks on phone: Not on file     Gets together: Not on file     Attends Gnosticism service: Not on file     Active member of club or organization: Not on file     Attends meetings of clubs or organizations: Not on file     Relationship status: Not on file    Intimate partner violence:     Fear of current or ex partner: Not on file     Emotionally abused: Not on file     Physically

## 2019-07-04 PROBLEM — I48.0 PAROXYSMAL A-FIB (HCC): Status: ACTIVE | Noted: 2019-07-04

## 2019-07-04 NOTE — ASSESSMENT & PLAN NOTE
Admitted to [facility] 5/27-5/29/19 with pneumonia. Treated with a.fib. Was found to have a RLL pleural effusion. Needs f/u CXR. Has appointment with Dr. Maria Luisa Blood later this month. On no oxygen.

## 2019-07-09 ENCOUNTER — NURSE ONLY (OUTPATIENT)
Dept: CARDIOLOGY CLINIC | Age: 69
End: 2019-07-09
Payer: MEDICARE

## 2019-07-09 DIAGNOSIS — Z45.09 ENCOUNTER FOR ELECTRONIC ANALYSIS OF REVEAL EVENT RECORDER: ICD-10-CM

## 2019-07-09 DIAGNOSIS — I48.91 ATRIAL FIBRILLATION WITH RVR (HCC): ICD-10-CM

## 2019-07-09 PROCEDURE — 93299 PR REM INTERROG ICPMS/SCRMS <30 D TECH REVIEW: CPT | Performed by: INTERNAL MEDICINE

## 2019-07-09 PROCEDURE — 93298 REM INTERROG DEV EVAL SCRMS: CPT | Performed by: INTERNAL MEDICINE

## 2019-07-11 ENCOUNTER — TELEPHONE (OUTPATIENT)
Dept: CARDIOLOGY CLINIC | Age: 69
End: 2019-07-11

## 2019-07-11 NOTE — TELEPHONE ENCOUNTER
Pt calling, asking if she should still take the diltiazem now that she is taking eliquis? Please call to advise. Thank you.

## 2019-07-11 NOTE — TELEPHONE ENCOUNTER
Spoke to the pt and she states she was just started on Eliquis today. She has not stopped taking the Diltiazem. She wants to make sure she is supposed to be taking both medications together? Pt had a IILR placed 5/28/19. Progress note with [facility] 5/29/19  Plan:   - Atrial fibrillation with3 rapid ventricular response              Self converted. No recurrence              Can be due to infection or a new problem or side effect of ibrutinib.             s/p Implantable Loop recorder and monitor and if recurrent, will need anticoagulation due to [id number]-PSTs score of 3  - Pneumonia              On antibiotics  - CLL              On Ibrutinib and stable              On hold   - HTN              BP is well controlled.  Continue current meds.    change cardizem to  mg qd

## 2019-07-11 NOTE — TELEPHONE ENCOUNTER
Called patient d/t A seeing Afib on ILR. Requested pt start on Eliquis 5 mg BID.  Called patient and explained

## 2019-07-23 ENCOUNTER — OFFICE VISIT (OUTPATIENT)
Dept: PULMONOLOGY | Age: 69
End: 2019-07-23
Payer: MEDICARE

## 2019-07-23 VITALS
WEIGHT: 223 LBS | BODY MASS INDEX: 35 KG/M2 | OXYGEN SATURATION: 95 % | HEIGHT: 67 IN | RESPIRATION RATE: 18 BRPM | HEART RATE: 91 BPM | SYSTOLIC BLOOD PRESSURE: 137 MMHG | DIASTOLIC BLOOD PRESSURE: 84 MMHG

## 2019-07-23 DIAGNOSIS — J18.9 PNEUMONIA DUE TO INFECTIOUS ORGANISM, UNSPECIFIED LATERALITY, UNSPECIFIED PART OF LUNG: Primary | ICD-10-CM

## 2019-07-23 DIAGNOSIS — I48.0 PAROXYSMAL A-FIB (HCC): ICD-10-CM

## 2019-07-23 DIAGNOSIS — C91.10 CLL (CHRONIC LYMPHOCYTIC LEUKEMIA) (HCC): ICD-10-CM

## 2019-07-23 DIAGNOSIS — J44.9 COPD, SEVERITY TO BE DETERMINED (HCC): ICD-10-CM

## 2019-07-23 DIAGNOSIS — J90 PLEURAL EFFUSION: ICD-10-CM

## 2019-07-23 PROCEDURE — 99204 OFFICE O/P NEW MOD 45 MIN: CPT | Performed by: INTERNAL MEDICINE

## 2019-07-23 PROCEDURE — 1090F PRES/ABSN URINE INCON ASSESS: CPT | Performed by: INTERNAL MEDICINE

## 2019-07-23 PROCEDURE — 1123F ACP DISCUSS/DSCN MKR DOCD: CPT | Performed by: INTERNAL MEDICINE

## 2019-07-23 PROCEDURE — 1036F TOBACCO NON-USER: CPT | Performed by: INTERNAL MEDICINE

## 2019-07-23 PROCEDURE — G8427 DOCREV CUR MEDS BY ELIG CLIN: HCPCS | Performed by: INTERNAL MEDICINE

## 2019-07-23 PROCEDURE — 3023F SPIROM DOC REV: CPT | Performed by: INTERNAL MEDICINE

## 2019-07-23 PROCEDURE — 4040F PNEUMOC VAC/ADMIN/RCVD: CPT | Performed by: INTERNAL MEDICINE

## 2019-07-23 PROCEDURE — G8926 SPIRO NO PERF OR DOC: HCPCS | Performed by: INTERNAL MEDICINE

## 2019-07-23 PROCEDURE — G8400 PT W/DXA NO RESULTS DOC: HCPCS | Performed by: INTERNAL MEDICINE

## 2019-07-23 PROCEDURE — G8417 CALC BMI ABV UP PARAM F/U: HCPCS | Performed by: INTERNAL MEDICINE

## 2019-07-23 PROCEDURE — 3017F COLORECTAL CA SCREEN DOC REV: CPT | Performed by: INTERNAL MEDICINE

## 2019-07-23 ASSESSMENT — ENCOUNTER SYMPTOMS
DIARRHEA: 0
ABDOMINAL PAIN: 0
CONSTIPATION: 0
NAUSEA: 0
SINUS PRESSURE: 0

## 2019-07-23 NOTE — PROGRESS NOTES
[facility]  Consult Note  Pat Barros MD       Heri Jerome   YOB: 1950    Date of Visit:  7/23/2019    Assessment/Plan:  1. Pneumonia due to infectious organism, unspecified laterality, unspecified part of lung/COPD  CXR from June shows infiltrate and effusion on the right  Have her get CT chest  She has a smoking history and will get her a PFT as well. 2. Pleural effusion  Repeat imaging as above    3. Paroxysmal A-fib (HCC)  Wearing a loop recorder    4. CLL (chronic lymphocytic leukemia) ([facility])  Stable at the moment. Chief Complaint   Patient presents with    Abnormal Chest X-ray     NPV - pt referred by Dr Ap Bella for her recurrent Pneumonia       HPI  The patient is referred by Dr Ap Bella due to pneumonia and pleural effusion. She also has a history of CLL. But is not on any treatment at this time. She discovered afib when she was hospitalized for pneumonia in June. She has no history of breathing problems before this .Terra Diallo smoked up to about 6 months ago. She denies cough and wheezing and feels her breathing is returning  To normal for her. She denies symptoms of GERD and has not trouble with swallowing or choking when she eats or drinks. Review of Systems  Review of Systems   Constitutional: Negative for fatigue and fever. HENT: Negative for congestion and sinus pressure. Eyes: Negative for visual disturbance. Cardiovascular: Negative for chest pain and palpitations. Gastrointestinal: Negative for abdominal pain, constipation, diarrhea and nausea. Genitourinary: Negative for difficulty urinating. Musculoskeletal: Negative for arthralgias. Skin: Negative for rash. Neurological: Negative for dizziness and light-headedness. Hematological: Does not bruise/bleed easily. Psychiatric/Behavioral: Negative for behavioral problems.        History  I have reviewed past medical, surgical, social and family

## 2019-08-13 ENCOUNTER — NURSE ONLY (OUTPATIENT)
Dept: CARDIOLOGY CLINIC | Age: 69
End: 2019-08-13
Payer: MEDICARE

## 2019-08-13 DIAGNOSIS — Z45.09 ENCOUNTER FOR ELECTRONIC ANALYSIS OF REVEAL EVENT RECORDER: ICD-10-CM

## 2019-08-13 DIAGNOSIS — I48.91 ATRIAL FIBRILLATION WITH RVR (HCC): ICD-10-CM

## 2019-08-13 PROCEDURE — 93299 PR REM INTERROG ICPMS/SCRMS <30 D TECH REVIEW: CPT | Performed by: INTERNAL MEDICINE

## 2019-08-13 PROCEDURE — 93298 REM INTERROG DEV EVAL SCRMS: CPT | Performed by: INTERNAL MEDICINE

## 2019-09-24 NOTE — PROGRESS NOTES
[address]   Electrophysiology Hospital Follow Up   Date: 9/26/2019      CC: Atrial Fibrillation  HPI: Servando Davison is a 77 y.o. female with a PMH of CLL which she is on chemo for, and HTN. She was seen in the hospital 5/2019 after present with a fever and palpitations. She was found to have Afib with RVR but converted to SR spontaneously. An ILR was implanted during her hospital stay to monitor for further arrhythmia. Miguel presents to the office in follow up for her ILR implantation. She has not had any further episodes of atrial fibrillation but has had some SVT on today's interrogation in July. She states that she is feeling well from a cardiac standpoint. We will continue to monitor her remotely. She is off of her chemo currently. Past Medical History:   Diagnosis Date    Anemia     [facility]) ([facility] [address]     Hernia     Hypertension     Leukemia ([facility] [address]         Past Surgical History:   Procedure Laterality Date    APPENDECTOMY      HYSTERECTOMY      INSERTABLE CARDIAC MONITOR  05/28/2019    By Adrianna Gavin    OVARY REMOVAL      VARICOSE VEIN SURGERY         Allergies: Allergies   Allergen Reactions    Amoxicillin     Lisinopril Other (See Comments)     States it caused depression     Molds & Smuts     Penicillins        Social History:   reports that she quit smoking about 8 months ago. Her smoking use included cigarettes. She started smoking about 51 years ago. She has a 52.00 pack-year smoking history. She has never used smokeless tobacco. She reports that she does not drink alcohol or use drugs. Family History:  family history includes Allergy (Severe) in her mother; Cancer in her father and mother; Mental Illness in her sister. Reviewed. Denies family history of sudden cardiac death, arrhythmia, premature CAD    Review of System:  All other systems reviewed and are negative except for that noted above.  Pertinent negatives are:   General: negative for concentric left ventricular hypertrophy.   Mild mitral regurgitation.   Mild tricuspid regurgitation with PASP of 35-40 mmHg.   There is a small circumferential pericardial effusion noted.     Cath:     Medication:  Current Outpatient Medications   Medication Sig Dispense Refill    apixaban (ELIQUIS) 5 MG TABS tablet Take 1 tablet by mouth 2 times daily 60 tablet 6    diltiazem (CARDIZEM CD) 120 MG extended release capsule Take 1 capsule by mouth daily 90 capsule 3    vitamin D (ERGOCALCIFEROL) 51361 units CAPS capsule Take 1 capsule by mouth once a week 12 capsule 1    Ascorbic Acid (VITAMIN C) 500 MG tablet Take 500 mg by mouth daily      rosuvastatin (CRESTOR) 10 MG tablet Take 1 tablet by mouth daily (Patient not taking: Reported on 9/26/2019) 90 tablet 3     No current facility-administered medications for this visit. Assessment and plan:   Atrial fibrillation RVR              [id number] score of 3, Eliquis 5 mg BID   Cardizem  mg   Afib risk factors including age, HTN, obesity, inactivity and SAEED were discussed with patient. Risk factor modification recommended. All questions were answered. No afib on device today will continue to monitor remotely    Supraventricular tachycardia   One SVT for 6 sec , likely atrial tachycardia  In July    Will monitor    s/p Implantable Loop recorder    The CIED was interrogated and programmed and I supervised and reviewed all the data. All findings and changes are in device interrogation sheat and reflect my personal interpretation and changes and is scanned to Epic. One SVT for 6 sec , likely atrial tachycardia  In July , on today's interrogation     - CLL   Currently off chemo              was On Ibrutinib and stable       - HTN     Vitals:    09/26/19 1008   BP: 130/86   Pulse:                  BP is well controlled.  Continue current meds.    cardizem to  mg qd     6 month EPNP      I independently reviewed  device check interrogation

## 2019-09-26 ENCOUNTER — NURSE ONLY (OUTPATIENT)
Dept: CARDIOLOGY CLINIC | Age: 69
End: 2019-09-26
Payer: MEDICARE

## 2019-09-26 ENCOUNTER — OFFICE VISIT (OUTPATIENT)
Dept: CARDIOLOGY CLINIC | Age: 69
End: 2019-09-26
Payer: MEDICARE

## 2019-09-26 VITALS
HEART RATE: 85 BPM | SYSTOLIC BLOOD PRESSURE: 130 MMHG | BODY MASS INDEX: 34.56 KG/M2 | WEIGHT: 228 LBS | HEIGHT: 68 IN | DIASTOLIC BLOOD PRESSURE: 86 MMHG

## 2019-09-26 DIAGNOSIS — C91.10 CLL (CHRONIC LYMPHOCYTIC LEUKEMIA) (HCC): ICD-10-CM

## 2019-09-26 DIAGNOSIS — Z45.09 ENCOUNTER FOR ELECTRONIC ANALYSIS OF REVEAL EVENT RECORDER: ICD-10-CM

## 2019-09-26 DIAGNOSIS — I47.1 SVT (SUPRAVENTRICULAR TACHYCARDIA) (HCC): Primary | ICD-10-CM

## 2019-09-26 DIAGNOSIS — I48.91 ATRIAL FIBRILLATION WITH RVR (HCC): ICD-10-CM

## 2019-09-26 DIAGNOSIS — I10 BENIGN ESSENTIAL HTN: ICD-10-CM

## 2019-09-26 PROCEDURE — G8427 DOCREV CUR MEDS BY ELIG CLIN: HCPCS | Performed by: INTERNAL MEDICINE

## 2019-09-26 PROCEDURE — G8400 PT W/DXA NO RESULTS DOC: HCPCS | Performed by: INTERNAL MEDICINE

## 2019-09-26 PROCEDURE — 99214 OFFICE O/P EST MOD 30 MIN: CPT | Performed by: INTERNAL MEDICINE

## 2019-09-26 PROCEDURE — G8417 CALC BMI ABV UP PARAM F/U: HCPCS | Performed by: INTERNAL MEDICINE

## 2019-09-26 PROCEDURE — 3017F COLORECTAL CA SCREEN DOC REV: CPT | Performed by: INTERNAL MEDICINE

## 2019-09-26 PROCEDURE — 4040F PNEUMOC VAC/ADMIN/RCVD: CPT | Performed by: INTERNAL MEDICINE

## 2019-09-26 PROCEDURE — 1036F TOBACCO NON-USER: CPT | Performed by: INTERNAL MEDICINE

## 2019-09-26 PROCEDURE — 1123F ACP DISCUSS/DSCN MKR DOCD: CPT | Performed by: INTERNAL MEDICINE

## 2019-09-26 PROCEDURE — 93291 INTERROG DEV EVAL SCRMS IP: CPT | Performed by: INTERNAL MEDICINE

## 2019-09-26 PROCEDURE — 1090F PRES/ABSN URINE INCON ASSESS: CPT | Performed by: INTERNAL MEDICINE

## 2019-09-26 NOTE — PROGRESS NOTES
Patient comes in for interrogation of their implanted loop recorder. Interrogation shows short SVT recordings. Patient will see Dr. Karthik Dominguez today.

## 2019-10-29 ENCOUNTER — NURSE ONLY (OUTPATIENT)
Dept: CARDIOLOGY CLINIC | Age: 69
End: 2019-10-29
Payer: MEDICARE

## 2019-10-29 DIAGNOSIS — I48.91 ATRIAL FIBRILLATION WITH RVR (HCC): ICD-10-CM

## 2019-10-29 DIAGNOSIS — Z45.09 ENCOUNTER FOR ELECTRONIC ANALYSIS OF REVEAL EVENT RECORDER: ICD-10-CM

## 2019-10-29 PROCEDURE — 93298 REM INTERROG DEV EVAL SCRMS: CPT | Performed by: INTERNAL MEDICINE

## 2019-10-29 PROCEDURE — 93299 PR REM INTERROG ICPMS/SCRMS <30 D TECH REVIEW: CPT | Performed by: INTERNAL MEDICINE

## 2019-12-03 ENCOUNTER — NURSE ONLY (OUTPATIENT)
Dept: CARDIOLOGY CLINIC | Age: 69
End: 2019-12-03
Payer: MEDICARE

## 2019-12-03 DIAGNOSIS — Z45.09 ENCOUNTER FOR ELECTRONIC ANALYSIS OF REVEAL EVENT RECORDER: ICD-10-CM

## 2019-12-03 DIAGNOSIS — I48.91 ATRIAL FIBRILLATION WITH RVR (HCC): ICD-10-CM

## 2019-12-03 PROCEDURE — 93299 PR REM INTERROG ICPMS/SCRMS <30 D TECH REVIEW: CPT | Performed by: INTERNAL MEDICINE

## 2019-12-03 PROCEDURE — 93298 REM INTERROG DEV EVAL SCRMS: CPT | Performed by: INTERNAL MEDICINE

## 2020-01-07 ENCOUNTER — NURSE ONLY (OUTPATIENT)
Dept: CARDIOLOGY CLINIC | Age: 70
End: 2020-01-07
Payer: MEDICARE

## 2020-01-07 PROCEDURE — 93298 REM INTERROG DEV EVAL SCRMS: CPT | Performed by: INTERNAL MEDICINE

## 2020-01-24 RX ORDER — APIXABAN 5 MG/1
TABLET, FILM COATED ORAL
Qty: 60 TABLET | Refills: 11 | Status: SHIPPED | OUTPATIENT
Start: 2020-01-24 | End: 2021-02-02

## 2020-02-09 PROCEDURE — 93298 REM INTERROG DEV EVAL SCRMS: CPT | Performed by: INTERNAL MEDICINE

## 2020-02-09 PROCEDURE — G2066 INTER DEVC REMOTE 30D: HCPCS | Performed by: INTERNAL MEDICINE

## 2020-02-10 ENCOUNTER — NURSE ONLY (OUTPATIENT)
Dept: CARDIOLOGY CLINIC | Age: 70
End: 2020-02-10
Payer: MEDICARE

## 2020-02-10 NOTE — LETTER
[address] [facility] [phone]  [address]  [address] [address] [phone]    Pacemaker/Defibrillator Clinic          02/10/20        Dmitriy Medel  [address] [place] [address]        Dear Dmitriy Medel    This letter is to inform you that we received the transmission from your monitor at home that checks your implanted heart device. The next date your monitor will automatically transmit will be 5-4-20. If your report needs attention we will notify you. Your device and monitor are wireless and most transmit cellularly, but please periodically check your monitor is still plugged in to the electrical outlet. If you still use the telephone land line to send please ensure the connection to the phone juli is secure. This will help to ensure successful automatic transmissions in the future. Also, the monitor needs to be close to you while sleeping at night. Please be aware that the remote device transmission sites are periodically monitored only during regular business hours during which simultaneous in-office device clinics are being run. If your transmission requires attention, we will contact you as soon as possible. Thank you.             [address]

## 2020-03-02 PROBLEM — H61.21 IMPACTED CERUMEN, RIGHT EAR: Status: RESOLVED | Noted: 2018-10-02 | Resolved: 2020-03-02

## 2020-03-02 PROBLEM — J90 PLEURAL EFFUSION: Status: RESOLVED | Noted: 2019-07-23 | Resolved: 2020-03-02

## 2020-03-02 PROBLEM — R73.9 HYPERGLYCEMIA: Status: RESOLVED | Noted: 2019-04-01 | Resolved: 2020-03-02

## 2020-03-02 PROBLEM — Z71.85 VACCINE COUNSELING: Status: RESOLVED | Noted: 2018-10-02 | Resolved: 2020-03-02

## 2020-03-02 PROBLEM — J18.9 PNEUMONIA: Status: RESOLVED | Noted: 2019-05-27 | Resolved: 2020-03-02

## 2020-03-02 NOTE — PROGRESS NOTES
injuries  · Neurological/Psych: Negative for confusion, seizures, dizziness, headaches, balance issues or TIA-like symptoms. No anxiety, depression, or insomnia    Physical Examination:  There were no vitals filed for this visit. Wt Readings from Last 3 Encounters:   09/26/19 228 lb (103.4 kg)   07/23/19 223 lb (101.2 kg)   07/01/19 222 lb 3.2 oz (100.8 kg)     Constitutional: Cooperative and in no apparent distress, and appears well nourished  Skin: Warm and pink; no pallor, cyanosis, bruising, or clubbing  HEENT: Symmetric and normocephalic. PERRL, EOM intact. Conjunctiva pink with clear sclera. Mucus membranes pink and moist. Teeth intact. Thyroid smooth without nodules or goiter  Respiratory: Respirations symmetric and unlabored. Lungs clear to auscultation bilaterally, no wheezing, rhonchi, or crackles  Cardiovascular:  Regular rate and rhythm. S1/S2 present without murmurs, rubs, or gallops. Peripheral pulses 2+, capillary refill < 3 seconds. No elevation of JVP. Trace edema (chronic)  Gastrointestinal: Abdomen soft and round. Bowel sounds normoactive in all quadrants without tenderness or masses. + Obese  Musculoskeletal: Bilateral upper and lower extremity strength 5/5 with full ROM. Neurological/Psych: Awake and orientated to person, place and time. Calm affect, appropriate mood. Pertinent labs, diagnostic, device, and imaging results reviewed as a part of this visit    LABS    CBC:   Lab Results   Component Value Date    WBC 9.1 05/29/2019    HGB 11.5 (L) 05/29/2019    HCT 35.0 (L) 05/29/2019    MCV 85.1 05/29/2019     05/29/2019     BMP:   Lab Results   Component Value Date    CREATININE 0.8 05/29/2019    BUN 13 05/29/2019     05/29/2019    K 3.8 05/29/2019     05/29/2019    CO2 21 05/29/2019     CrCl cannot be calculated (Patient's most recent lab result is older than the maximum 120 days allowed. ).      Thyroid:   Lab Results   Component Value Date    TSH 1.72 05/27/2019 Epic  - Device shows: Multiple brief episodes of SVT. No atrial fibrillation  - Follow up with device clinic as scheduled    4. HTN  - Uncontrolled: Goal <130/80  - Continue current medications   ~ Increase cardizem to BID  - Encouraged to monitor and log BP readings at home, then bring log to next visit  - Discussed importance of low sodium diet, weight control and exercise    5. CLL   - Stable; in remission   - Off chemotherapy   - Followed by oncology (Dr. Varghese Neves)    Plan:  1. Increase cardizem to 120 mg twice per day  2. Check your blood pressure 2-3 times per week  3. Call if your symptoms worsen  4. Continue to walk daily    F/U: Follow-up with EP in 6 months  -Follow up with device clinic as scheduled  -Call Ankur Glasgow at [phone] with any questions    Diet & Exercise:   The patient is counseled to follow a low salt diet to assure blood pressure remains controlled for cardiovascular risk factor modification   The patient is counseled to avoid excess caffeine, and energy drinks as this may exacerbated ectopy and arrhythmia   The patient is counseled to lose weight to control cardiovascular risk factors   Exercise program discussed: To improve overall cardiovascular health, the patient is instructed to increase cardiovascular related activities with a goal of 150 min/week of moderate level activity or 10,000 steps per day. Encouraged to perform as much activity as tolerated    Quality Metrics  1. Tobacco Cessation Counseling: N/A  2. Retake of BP if >140/90: Yes   3. Documentation to PCP: Note sent to PCP office visit  4. CAD patient on anti-platelet: N/A   5.   CAD patient on STATIN therapy: N/A   6. Patient with history of CHF and atrial fibrillation on anticoagulation: Yes (Eliquis)      I have addressed the patient's cardiac risk factors and adjusted pharmacologic treatment as needed. In addition, I have reinforced the need for patient directed risk factor modification.     I

## 2020-03-30 ENCOUNTER — OFFICE VISIT (OUTPATIENT)
Dept: CARDIOLOGY CLINIC | Age: 70
End: 2020-03-30
Payer: MEDICARE

## 2020-03-30 ENCOUNTER — NURSE ONLY (OUTPATIENT)
Dept: CARDIOLOGY CLINIC | Age: 70
End: 2020-03-30
Payer: MEDICARE

## 2020-03-30 VITALS
BODY MASS INDEX: 32.28 KG/M2 | HEART RATE: 76 BPM | WEIGHT: 213 LBS | DIASTOLIC BLOOD PRESSURE: 101 MMHG | HEIGHT: 68 IN | SYSTOLIC BLOOD PRESSURE: 155 MMHG

## 2020-03-30 PROBLEM — I47.19 ATRIAL TACHYCARDIA: Status: ACTIVE | Noted: 2020-03-30

## 2020-03-30 PROBLEM — I47.1 ATRIAL TACHYCARDIA (HCC): Status: ACTIVE | Noted: 2020-03-30

## 2020-03-30 PROBLEM — R07.89 ATYPICAL CHEST PAIN: Status: RESOLVED | Noted: 2019-04-01 | Resolved: 2020-03-30

## 2020-03-30 PROBLEM — R63.5 WEIGHT GAIN: Status: RESOLVED | Noted: 2018-04-02 | Resolved: 2020-03-30

## 2020-03-30 PROCEDURE — 1123F ACP DISCUSS/DSCN MKR DOCD: CPT | Performed by: NURSE PRACTITIONER

## 2020-03-30 PROCEDURE — 3017F COLORECTAL CA SCREEN DOC REV: CPT | Performed by: NURSE PRACTITIONER

## 2020-03-30 PROCEDURE — 4040F PNEUMOC VAC/ADMIN/RCVD: CPT | Performed by: NURSE PRACTITIONER

## 2020-03-30 PROCEDURE — G8417 CALC BMI ABV UP PARAM F/U: HCPCS | Performed by: NURSE PRACTITIONER

## 2020-03-30 PROCEDURE — 99214 OFFICE O/P EST MOD 30 MIN: CPT | Performed by: NURSE PRACTITIONER

## 2020-03-30 PROCEDURE — G8484 FLU IMMUNIZE NO ADMIN: HCPCS | Performed by: NURSE PRACTITIONER

## 2020-03-30 PROCEDURE — 93291 INTERROG DEV EVAL SCRMS IP: CPT | Performed by: INTERNAL MEDICINE

## 2020-03-30 PROCEDURE — 1090F PRES/ABSN URINE INCON ASSESS: CPT | Performed by: NURSE PRACTITIONER

## 2020-03-30 PROCEDURE — G8400 PT W/DXA NO RESULTS DOC: HCPCS | Performed by: NURSE PRACTITIONER

## 2020-03-30 PROCEDURE — G8427 DOCREV CUR MEDS BY ELIG CLIN: HCPCS | Performed by: NURSE PRACTITIONER

## 2020-03-30 PROCEDURE — 1036F TOBACCO NON-USER: CPT | Performed by: NURSE PRACTITIONER

## 2020-03-30 PROCEDURE — 93000 ELECTROCARDIOGRAM COMPLETE: CPT | Performed by: NURSE PRACTITIONER

## 2020-03-30 RX ORDER — DILTIAZEM HYDROCHLORIDE 120 MG/1
120 CAPSULE, COATED, EXTENDED RELEASE ORAL 2 TIMES DAILY
Qty: 90 CAPSULE | Refills: 3
Start: 2020-03-30 | End: 2020-04-28 | Stop reason: SDUPTHER

## 2020-03-30 NOTE — PROGRESS NOTES
Patient comes in for interrogation of their implanted loop recorder. Interrogation shows 46 Tachy episodes. Last on 3/12/2020, longest 24 seconds with a Max V rate of 222 bpm. Patient remains on Cardizem. Currently in NSR. Implanted for palpitations and AF. Remains on Eliquis. Patient will see Raysa Grant our NP today.

## 2020-04-28 ENCOUNTER — TELEPHONE (OUTPATIENT)
Dept: CARDIOLOGY CLINIC | Age: 70
End: 2020-04-28

## 2020-04-28 RX ORDER — DILTIAZEM HYDROCHLORIDE 120 MG/1
120 CAPSULE, COATED, EXTENDED RELEASE ORAL 2 TIMES DAILY
Qty: 180 CAPSULE | Refills: 1 | Status: SHIPPED | OUTPATIENT
Start: 2020-04-28 | End: 2020-10-01

## 2020-05-04 ENCOUNTER — NURSE ONLY (OUTPATIENT)
Dept: CARDIOLOGY CLINIC | Age: 70
End: 2020-05-04
Payer: MEDICARE

## 2020-05-04 PROCEDURE — 93298 REM INTERROG DEV EVAL SCRMS: CPT | Performed by: INTERNAL MEDICINE

## 2020-05-04 PROCEDURE — G2066 INTER DEVC REMOTE 30D: HCPCS | Performed by: INTERNAL MEDICINE

## 2020-05-04 NOTE — LETTER
[address] [facility] [phone] [address] [phone]    Pacemaker/Defibrillator Clinic          05/04/20        Tennille Espinoza  [address] [place] [address]        Dear Tennille Espinoza    This letter is to inform you that we received the transmission from your monitor at home that checks your implanted heart device. The next date your monitor will automatically transmit will be 6-8-20. If your report needs attention we will notify you. Your device and monitor are wireless and most transmit cellularly, but please periodically check your monitor is still plugged in to the electrical outlet. If you still use the telephone land line to send please ensure the connection to the phone juli is secure. This will help to ensure successful automatic transmissions in the future. Also, the monitor needs to be close to you while sleeping at night. Please be aware that the remote device transmission sites are periodically monitored only during regular business hours during which simultaneous in-office device clinics are being run. If your transmission requires attention, we will contact you as soon as possible. Thank you.             [address]

## 2020-05-06 ENCOUNTER — CLINICAL DOCUMENTATION (OUTPATIENT)
Dept: CARDIOLOGY CLINIC | Age: 70
End: 2020-05-06

## 2020-06-08 ENCOUNTER — NURSE ONLY (OUTPATIENT)
Dept: CARDIOLOGY CLINIC | Age: 70
End: 2020-06-08
Payer: MEDICARE

## 2020-06-08 PROCEDURE — 93298 REM INTERROG DEV EVAL SCRMS: CPT | Performed by: INTERNAL MEDICINE

## 2020-06-08 PROCEDURE — G2066 INTER DEVC REMOTE 30D: HCPCS | Performed by: INTERNAL MEDICINE

## 2020-07-13 ENCOUNTER — NURSE ONLY (OUTPATIENT)
Dept: CARDIOLOGY CLINIC | Age: 70
End: 2020-07-13
Payer: MEDICARE

## 2020-07-13 PROCEDURE — 93298 REM INTERROG DEV EVAL SCRMS: CPT | Performed by: INTERNAL MEDICINE

## 2020-07-13 PROCEDURE — G2066 INTER DEVC REMOTE 30D: HCPCS | Performed by: INTERNAL MEDICINE

## 2020-07-13 NOTE — PROGRESS NOTES
[facility] remote Linq report shows short SVT. Pt shows no symptoms. We will continue to monitor remotely.

## 2020-07-13 NOTE — LETTER
[address] [facility] [phone]  [address] [phone]    Pacemaker/Defibrillator Clinic          07/13/20        Chase Conklin  [address] [address]        Dear Chase Conklin    This letter is to inform you that we received the transmission from your monitor at home that checks your implanted heart device. The next date your monitor will automatically transmit will be 8-17-20. If your report needs attention we will notify you. Your device and monitor are wireless and most transmit cellularly, but please periodically check your monitor is still plugged in to the electrical outlet. If you still use the telephone land line to send please ensure the connection to the phone juli is secure. This will help to ensure successful automatic transmissions in the future. Also, the monitor needs to be close to you while sleeping at night. Please be aware that the remote device transmission sites are periodically monitored only during regular business hours during which simultaneous in-office device clinics are being run. If your transmission requires attention, we will contact you as soon as possible. Thank you.             [address]

## 2020-08-17 ENCOUNTER — NURSE ONLY (OUTPATIENT)
Dept: CARDIOLOGY CLINIC | Age: 70
End: 2020-08-17
Payer: MEDICARE

## 2020-08-17 PROCEDURE — 93298 REM INTERROG DEV EVAL SCRMS: CPT | Performed by: INTERNAL MEDICINE

## 2020-08-17 PROCEDURE — G2066 INTER DEVC REMOTE 30D: HCPCS | Performed by: INTERNAL MEDICINE

## 2020-08-17 NOTE — PROGRESS NOTES
We received a remote transmission form patient's monitor at home. Remote Linq report shows SVT. Pt showed no symptoms. EP physician to review. We will continue to monitor remotely.

## 2020-08-17 NOTE — LETTER
[address] [facility] [phone]  [address]   [address] [address] [phone]    Pacemaker/Defibrillator Clinic          08/17/20        Toni Turpin  [address] [facility]  Kim[place]ly [place]        Dear Toni Barretor    This letter is to inform you that we received the transmission from your monitor at home that checks your implanted heart device. The next date your monitor will automatically transmit will be 9-21-20. If your report needs attention we will notify you. Your device and monitor are wireless and most transmit cellularly, but please periodically check your monitor is still plugged in to the electrical outlet. If you still use the telephone land line to send please ensure the connection to the phone juli is secure. This will help to ensure successful automatic transmissions in the future. Also, the monitor needs to be close to you while sleeping at night. Please be aware that the remote device transmission sites are periodically monitored only during regular business hours during which simultaneous in-office device clinics are being run. If your transmission requires attention, we will contact you as soon as possible. Thank you.             [address]kur [address]

## 2020-09-29 NOTE — PROGRESS NOTES
[place]   Electrophysiology Follow Up   Date: 10/1/2020      CC: Atrial Tach  HPI: Erik Danielle is a 68 y.o. female with a PMH of CLL which she is on chemo for, and HTN. She was seen in the hospital 5/2019 after present with a fever and palpitations. She was found to have Afib with RVR but converted to SR spontaneously. An ILR was implanted during her hospital stay to monitor for further arrhythmia. Eric Arredondo presents to the office in follow up. She had a few episodes of SVT that longest lasted 8 seconds. She is doing well from a cardiac standpoint. Her blood pressure is elevated in the office today, she checks her pressure once a week. Past Medical History:   Diagnosis Date    Anemia     CLL (chronic lymphocytic leukemia) ([address]     Hernia     Hypertension     Leukemia ([facility] [address]         Past Surgical History:   Procedure Laterality Date    APPENDECTOMY      HYSTERECTOMY      INSERTABLE CARDIAC MONITOR  05/28/2019    By Xuan Flynn    OVARY REMOVAL      VARICOSE VEIN SURGERY         Allergies: Allergies   Allergen Reactions    Amoxicillin     Lisinopril Other (See Comments)     States it caused depression     Molds & Smuts     Penicillins        Social History:   reports that she quit smoking about 21 months ago. Her smoking use included cigarettes. She started smoking about 52 years ago. She has a 52.00 pack-year smoking history. She has never used smokeless tobacco. She reports that she does not drink alcohol or use drugs. Family History:  family history includes Allergy (Severe) in her mother; Cancer in her father and mother; Mental Illness in her sister. Reviewed. Denies family history of sudden cardiac death, arrhythmia, premature CAD    Review of System:  All other systems reviewed and are negative except for that noted above.  Pertinent negatives are:   General: negative for fever, chills   Ophthalmic ROS: negative for - eye pain or loss of vision  ENT ROS: negative for - headaches, sore throat   Respiratory: negative for - cough, sputum  Cardiovascular: Reviewed in HPI  Gastrointestinal: negative for - abdominal pain, diarrhea, N/V  Hematology: negative for - bleeding, blood clots, bruising or jaundice  Genito-Urinary:  negative for - Dysuria or incontinence  Musculoskeletal: negative for - Joint swelling, muscle pain  Neurological: negative for - confusion, dizziness, headaches   Psychiatric: No anxiety, no depression. Dermatological: negative for - rash    Physical Examination:  Vitals:    10/01/20 0904   BP: (!) 156/99   Pulse: Wt Readings from Last 3 Encounters:   03/30/20 213 lb (96.6 kg)   09/26/19 228 lb (103.4 kg)   07/23/19 223 lb (101.2 kg)       · Constitutional: Oriented. No distress. · Head: Normocephalic and atraumatic. · Mouth/Throat: Oropharynx is clear and moist.   · Eyes: Conjunctivae normal. EOM are normal.   · Neck: Neck supple. No rigidity. No JVD present. · Cardiovascular: Normal rate, regular rhythm, S1&S2. · Pulmonary/Chest: Bilateral respiratory sounds. No wheezes, No rhonchi. · Abdominal: Soft. Bowel sounds present. No distension, No tenderness. · Musculoskeletal: No tenderness. No edema    · Lymphadenopathy: Has no cervical adenopathy. · Neurological: Alert and oriented. Cranial nerve appears intact, No Gross deficit   · Skin: Skin is warm and dry. No rash noted. · Psychiatric: Has a normal behavior       Labs, diagnostic and imaging results reviewed. Reviewed.    Lab Results   Component Value Date    TSHREFLEX 1.87 10/02/2018    TSH 1.72 05/27/2019    CREATININE 0.8 05/29/2019    CREATININE 0.9 05/28/2019    AST 8 05/28/2019    ALT 15 05/28/2019       ECG:  10/1/20   Sinus rhythm    Echo: 5/28/19   Summary   Normal left ventricle size and systolic function with an estimated ejection   fraction of 65-70%.   There is mild concentric left ventricular hypertrophy.   Mild mitral regurgitation.   Mild tricuspid regurgitation with PASP of 35-40 mmHg.   There is a small circumferential pericardial effusion noted.     Cath:     Medication:  Current Outpatient Medications   Medication Sig Dispense Refill    dilTIAZem (CARDIZEM CD) 180 MG extended release capsule Take 1 capsule by mouth 2 times daily 180 capsule 3    ELIQUIS 5 MG TABS tablet TAKE 1 TABLET BY MOUTH TWO TIMES A DAY  60 tablet 11    Ascorbic Acid (VITAMIN C) 500 MG tablet Take 500 mg by mouth daily       No current facility-administered medications for this visit. Assessment and plan:   Atrial fibrillation RVR              [id number] score of 3, Eliquis 5 mg BID   Increase Cardizem  mg BID   Afib risk factors including age, HTN, obesity, inactivity and SAEED were discussed with patient. Risk factor modification recommended. All questions were answered. No A. fib has been seen on the monitor so far. Supraventricular tachycardia/Atrial Tachycardia   Few more episodes of SVT that longest was 8-second and suggestive atrial tachycardia   Will monitor    s/p Implantable Loop recorder    The CIED was interrogated and programmed and I supervised and reviewed all the data. All findings and changes are in device interrogation sheat and reflect my personal interpretation and changes and is scanned to Epic. A few episodes of atrial runs with longest  episode of SVT lasting 8 seconds    - CLL   Currently off chemo              was On Ibrutinib and stable   Followed by oncology        - HTN     Vitals:    10/01/20 0904   BP: (!) 156/99   Pulse:                  not well controlled    Increase cardizem to  mg BID     Follow up 8 months      I independently reviewed  device check interrogation     Thank you for allowing me to participate in the care of Roberto Acuna. Further evaluation will be based upon the patient's clinical course and testing results. All questions and concerns were addressed to the patient/family.  Alternatives to my treatment were discussed. I have discussed the above stated plan and the patient verbalized understanding and agreed with the plan. NOTE: This report was transcribed using voice recognition software. Every effort was made to ensure accuracy, however, inadvertent computerized transcription errors may be present. Kalia Vargas MD, [facility] [address]   Office: [phone]     Scribe attestation:  This note was scribed in the presence of Kalia Vargas MD by Eulalio Dandy, RN      I, Dr. Kalia Vargas personally performed the services described in this documentation as scribed by RN in my presence, and it is both accurate and complete.

## 2020-10-01 ENCOUNTER — NURSE ONLY (OUTPATIENT)
Dept: CARDIOLOGY CLINIC | Age: 70
End: 2020-10-01
Payer: MEDICARE

## 2020-10-01 ENCOUNTER — OFFICE VISIT (OUTPATIENT)
Dept: CARDIOLOGY CLINIC | Age: 70
End: 2020-10-01
Payer: MEDICARE

## 2020-10-01 VITALS — DIASTOLIC BLOOD PRESSURE: 99 MMHG | SYSTOLIC BLOOD PRESSURE: 156 MMHG | HEART RATE: 68 BPM

## 2020-10-01 PROCEDURE — 1036F TOBACCO NON-USER: CPT | Performed by: INTERNAL MEDICINE

## 2020-10-01 PROCEDURE — 3017F COLORECTAL CA SCREEN DOC REV: CPT | Performed by: INTERNAL MEDICINE

## 2020-10-01 PROCEDURE — 4040F PNEUMOC VAC/ADMIN/RCVD: CPT | Performed by: INTERNAL MEDICINE

## 2020-10-01 PROCEDURE — 1090F PRES/ABSN URINE INCON ASSESS: CPT | Performed by: INTERNAL MEDICINE

## 2020-10-01 PROCEDURE — 99214 OFFICE O/P EST MOD 30 MIN: CPT | Performed by: INTERNAL MEDICINE

## 2020-10-01 PROCEDURE — G8400 PT W/DXA NO RESULTS DOC: HCPCS | Performed by: INTERNAL MEDICINE

## 2020-10-01 PROCEDURE — G8427 DOCREV CUR MEDS BY ELIG CLIN: HCPCS | Performed by: INTERNAL MEDICINE

## 2020-10-01 PROCEDURE — 93291 INTERROG DEV EVAL SCRMS IP: CPT | Performed by: INTERNAL MEDICINE

## 2020-10-01 PROCEDURE — 1123F ACP DISCUSS/DSCN MKR DOCD: CPT | Performed by: INTERNAL MEDICINE

## 2020-10-01 PROCEDURE — G8484 FLU IMMUNIZE NO ADMIN: HCPCS | Performed by: INTERNAL MEDICINE

## 2020-10-01 PROCEDURE — G8417 CALC BMI ABV UP PARAM F/U: HCPCS | Performed by: INTERNAL MEDICINE

## 2020-10-01 RX ORDER — DILTIAZEM HYDROCHLORIDE 180 MG/1
180 CAPSULE, COATED, EXTENDED RELEASE ORAL 2 TIMES DAILY
Qty: 180 CAPSULE | Refills: 3 | Status: SHIPPED | OUTPATIENT
Start: 2020-10-01 | End: 2021-10-04

## 2020-10-01 NOTE — PROGRESS NOTES
Patient comes in for interrogation of their implanted loop recorder. Interrogation shows 8 Tachy episodes appear to be AT/SVT. Last on 7/8/2020, longest 8 seconds with an Avg V rate of 207 bpm. Implanted for palpitations/AF management. Patient remains on Eliquis and Cardizem. Please see interrogation for more detail. Patient will see Dr. Schuyler Ruiz today and we will continue to follow the Patient remotely.

## 2020-11-01 PROCEDURE — 93298 REM INTERROG DEV EVAL SCRMS: CPT | Performed by: INTERNAL MEDICINE

## 2020-11-01 PROCEDURE — G2066 INTER DEVC REMOTE 30D: HCPCS | Performed by: INTERNAL MEDICINE

## 2020-11-02 ENCOUNTER — NURSE ONLY (OUTPATIENT)
Dept: CARDIOLOGY CLINIC | Age: 70
End: 2020-11-02
Payer: MEDICARE

## 2020-11-02 NOTE — LETTER
[address] [facility] [phone]  [address] [facility] [place] [address] [phone]    Pacemaker/Defibrillator Clinic          11/02/20        Irma Sun  [address] [facility] [address]        Dear Irma Sun    This letter is to inform you that we received the transmission from your monitor at home that checks your implanted heart device. The next date your monitor will automatically transmit will be 12-7-20. If your report needs attention we will notify you. Your device and monitor are wireless and most transmit cellularly, but please periodically check your monitor is still plugged in to the electrical outlet. If you still use the telephone land line to send please ensure the connection to the phone juli is secure. This will help to ensure successful automatic transmissions in the future. Also, the monitor needs to be close to you while sleeping at night. Please be aware that the remote device transmission sites are periodically monitored only during regular business hours during which simultaneous in-office device clinics are being run. If your transmission requires attention, we will contact you as soon as possible. Thank you.             [facility]

## 2020-12-07 ENCOUNTER — NURSE ONLY (OUTPATIENT)
Dept: CARDIOLOGY CLINIC | Age: 70
End: 2020-12-07
Payer: MEDICARE

## 2020-12-07 PROCEDURE — 93298 REM INTERROG DEV EVAL SCRMS: CPT | Performed by: INTERNAL MEDICINE

## 2020-12-07 PROCEDURE — G2066 INTER DEVC REMOTE 30D: HCPCS | Performed by: INTERNAL MEDICINE

## 2020-12-07 NOTE — LETTER
[address] [facility] [phone]  [address] [place] [address] [phone]    Pacemaker/Defibrillator Clinic          12/07/20        Raffi Hart  [address] [place] [address]        Dear Raffi Hart    This letter is to inform you that we received the transmission from your monitor at home that checks your implanted heart device. The next date your monitor will automatically transmit will be 1-11-21. If your report needs attention we will notify you. Your device and monitor are wireless and most transmit cellularly, but please periodically check your monitor is still plugged in to the electrical outlet. If you still use the telephone land line to send please ensure the connection to the phone juli is secure. This will help to ensure successful automatic transmissions in the future. Also, the monitor needs to be close to you while sleeping at night. Please be aware that the remote device transmission sites are periodically monitored only during regular business hours during which simultaneous in-office device clinics are being run. If your transmission requires attention, we will contact you as soon as possible. Thank you.             [address]

## 2021-01-09 PROCEDURE — 93298 REM INTERROG DEV EVAL SCRMS: CPT | Performed by: INTERNAL MEDICINE

## 2021-01-09 PROCEDURE — G2066 INTER DEVC REMOTE 30D: HCPCS | Performed by: INTERNAL MEDICINE

## 2021-01-11 ENCOUNTER — NURSE ONLY (OUTPATIENT)
Dept: CARDIOLOGY CLINIC | Age: 71
End: 2021-01-11
Payer: MEDICARE

## 2021-01-11 DIAGNOSIS — I47.1 ATRIAL TACHYCARDIA (HCC): ICD-10-CM

## 2021-01-11 DIAGNOSIS — Z45.09 ENCOUNTER FOR ELECTRONIC ANALYSIS OF REVEAL EVENT RECORDER: ICD-10-CM

## 2021-01-11 NOTE — LETTER
[address] [facility] [phone]  [address] [place] [address] [phone]    Pacemaker/Defibrillator Clinic          01/11/21        Ash Diaz  [address] [place] [address]  [place]         Dear Ash Diaz    This letter is to inform you that we received the transmission from your monitor at home that checks your implanted heart device. The next date your monitor will automatically transmit will be 2-15-21. If your report needs attention we will notify you. Your device and monitor are wireless and most transmit cellularly, but please periodically check your monitor is still plugged in to the electrical outlet. If you still use the telephone land line to send please ensure the connection to the phone juli is secure. This will help to ensure successful automatic transmissions in the future. Also, the monitor needs to be close to you while sleeping at night. Please be aware that the remote device transmission sites are periodically monitored only during regular business hours during which simultaneous in-office device clinics are being run. If your transmission requires attention, we will contact you as soon as possible. Thank you.             [address]

## 2021-02-02 RX ORDER — APIXABAN 5 MG/1
TABLET, FILM COATED ORAL
Qty: 180 TABLET | Refills: 1 | Status: SHIPPED | OUTPATIENT
Start: 2021-02-02 | Stop reason: SDUPTHER

## 2021-02-14 PROCEDURE — G2066 INTER DEVC REMOTE 30D: HCPCS | Performed by: INTERNAL MEDICINE

## 2021-02-14 PROCEDURE — 93298 REM INTERROG DEV EVAL SCRMS: CPT | Performed by: INTERNAL MEDICINE

## 2021-02-15 ENCOUNTER — NURSE ONLY (OUTPATIENT)
Dept: CARDIOLOGY CLINIC | Age: 71
End: 2021-02-15
Payer: MEDICARE

## 2021-02-15 DIAGNOSIS — I47.1 ATRIAL TACHYCARDIA (HCC): ICD-10-CM

## 2021-02-15 DIAGNOSIS — Z45.09 ENCOUNTER FOR ELECTRONIC ANALYSIS OF REVEAL EVENT RECORDER: ICD-10-CM

## 2021-02-15 NOTE — LETTER
[address] [facility] [phone]  [address]   [address] [place] [address] [phone]    Pacemaker/Defibrillator Clinic          02/15/21        Marli Farley  [address] [facility] [address]        Dear Marli Farley    This letter is to inform you that we received the transmission from your monitor at home that checks your implanted heart device. The next date your monitor will automatically transmit will be 3-22-21. If your report needs attention we will notify you. Your device and monitor are wireless and most transmit cellularly, but please periodically check your monitor is still plugged in to the electrical outlet. If you still use the telephone land line to send please ensure the connection to the phone juli is secure. This will help to ensure successful automatic transmissions in the future. Also, the monitor needs to be close to you while sleeping at night. Please be aware that the remote device transmission sites are periodically monitored only during regular business hours during which simultaneous in-office device clinics are being run. If your transmission requires attention, we will contact you as soon as possible. Thank you.             [address]

## 2021-03-20 PROCEDURE — G2066 INTER DEVC REMOTE 30D: HCPCS | Performed by: INTERNAL MEDICINE

## 2021-03-20 PROCEDURE — 93298 REM INTERROG DEV EVAL SCRMS: CPT | Performed by: INTERNAL MEDICINE

## 2021-03-22 ENCOUNTER — NURSE ONLY (OUTPATIENT)
Dept: CARDIOLOGY CLINIC | Age: 71
End: 2021-03-22
Payer: MEDICARE

## 2021-03-22 DIAGNOSIS — I47.1 ATRIAL TACHYCARDIA (HCC): ICD-10-CM

## 2021-03-22 DIAGNOSIS — Z45.09 ENCOUNTER FOR ELECTRONIC ANALYSIS OF REVEAL EVENT RECORDER: ICD-10-CM

## 2021-03-22 NOTE — LETTER
[address] [facility] [phone]  [address] [place] [address] [phone]    Pacemaker/Defibrillator Clinic          03/22/21        Linus Sampson  [address] [place] [address]        Dear Linus Sampson    This letter is to inform you that we received the transmission from your monitor at home that checks your implanted heart device. The next date your monitor will automatically transmit will be 4-26-21. If your report needs attention we will notify you. Your device and monitor are wireless and most transmit cellularly, but please periodically check your monitor is still plugged in to the electrical outlet. If you still use the telephone land line to send please ensure the connection to the phone juli is secure. This will help to ensure successful automatic transmissions in the future. Also, the monitor needs to be close to you while sleeping at night. Please be aware that the remote device transmission sites are periodically monitored only during regular business hours during which simultaneous in-office device clinics are being run. If your transmission requires attention, we will contact you as soon as possible. Thank you.             [facility]

## 2021-04-26 ENCOUNTER — NURSE ONLY (OUTPATIENT)
Dept: CARDIOLOGY CLINIC | Age: 71
End: 2021-04-26
Payer: MEDICARE

## 2021-04-26 DIAGNOSIS — Z45.09 ENCOUNTER FOR ELECTRONIC ANALYSIS OF REVEAL EVENT RECORDER: ICD-10-CM

## 2021-04-26 DIAGNOSIS — I47.1 ATRIAL TACHYCARDIA (HCC): ICD-10-CM

## 2021-04-26 NOTE — LETTER
[address] [facility] [phone]  [address] [place] [address] [phone]    Pacemaker/Defibrillator Clinic          04/26/21        Farideh Covington  [address] [place] [address]  [place]         Dear Farideh Covington    This letter is to inform you that we received the transmission from your monitor at home that checks your implanted heart device. The next date your monitor will automatically transmit will be 5-27-21. If your report needs attention we will notify you. Your device and monitor are wireless and most transmit cellularly, but please periodically check your monitor is still plugged in to the electrical outlet. If you still use the telephone land line to send please ensure the connection to the phone juli is secure. This will help to ensure successful automatic transmissions in the future. Also, the monitor needs to be close to you while sleeping at night. Please be aware that the remote device transmission sites are periodically monitored only during regular business hours during which simultaneous in-office device clinics are being run. If your transmission requires attention, we will contact you as soon as possible. Thank you.             [facility]

## 2021-05-07 PROCEDURE — G2066 INTER DEVC REMOTE 30D: HCPCS | Performed by: INTERNAL MEDICINE

## 2021-05-07 PROCEDURE — 93298 REM INTERROG DEV EVAL SCRMS: CPT | Performed by: INTERNAL MEDICINE

## 2021-05-27 ENCOUNTER — NURSE ONLY (OUTPATIENT)
Dept: CARDIOLOGY CLINIC | Age: 71
End: 2021-05-27

## 2021-05-27 DIAGNOSIS — I47.1 ATRIAL TACHYCARDIA (HCC): ICD-10-CM

## 2021-05-27 DIAGNOSIS — Z45.09 ENCOUNTER FOR ELECTRONIC ANALYSIS OF REVEAL EVENT RECORDER: ICD-10-CM

## 2021-05-27 PROCEDURE — G2066 INTER DEVC REMOTE 30D: HCPCS | Performed by: INTERNAL MEDICINE

## 2021-05-27 PROCEDURE — 93298 REM INTERROG DEV EVAL SCRMS: CPT | Performed by: INTERNAL MEDICINE

## 2021-05-27 NOTE — LETTER
[address] [facility] [phone]  [address]   [address] [place] [address] [phone]    Pacemaker/Defibrillator Clinic    05/25/21      Leisa Rock  [address] [place] [address]      Dear Leisa Rock    This letter is to inform you that we received the transmission from your monitor at home that checks your implanted heart device. The next date your monitor will automatically transmit will be 6-28-21. If your report needs attention we will notify you. Your device and monitor are wireless and most transmit cellularly, but please periodically check your monitor is still plugged in to the electrical outlet. If you still use the telephone land line to send please ensure the connection to the phone juli is secure. This will help to ensure successful automatic transmissions in the future. Also, the monitor needs to be close to you while sleeping at night. Please be aware that the remote device transmission sites are periodically monitored only during regular business hours during which simultaneous in-office device clinics are being run. If your transmission requires attention, we will contact you as soon as possible. **PLEASE NOTE** that our [facility] policy and processes are changing to ensure a more seamless approach for all parties involved, allowing more time for our nurses to address patient issues and concerns. We will no longer be sending letters for NORMAL remote transmissions. You will be contacted by phone if your transmission requires attention (as previously done), and letters will only be sent regarding monitor disconnections or missed transmissions if you are unable to be reached by phone. Please do not be alarmed by this new process, as we will continue to contact you if your transmission report requires attention. This will be your final \"remote received\" letter.   From this point forward, the [facility] will be utilizing the no news is good news approach. As always, please feel free to contact your nurse with any questions or concerns. Thank you.     [facility]

## 2021-06-28 ENCOUNTER — NURSE ONLY (OUTPATIENT)
Dept: CARDIOLOGY CLINIC | Age: 71
End: 2021-06-28
Payer: MEDICARE

## 2021-06-28 DIAGNOSIS — I47.1 ATRIAL TACHYCARDIA (HCC): ICD-10-CM

## 2021-06-28 DIAGNOSIS — Z45.09 ENCOUNTER FOR ELECTRONIC ANALYSIS OF REVEAL EVENT RECORDER: ICD-10-CM

## 2021-06-28 PROCEDURE — G2066 INTER DEVC REMOTE 30D: HCPCS | Performed by: INTERNAL MEDICINE

## 2021-06-28 PROCEDURE — 93298 REM INTERROG DEV EVAL SCRMS: CPT | Performed by: INTERNAL MEDICINE

## 2021-07-29 NOTE — PROGRESS NOTES
We received a remote transmission from patient's monitor at home. Remote Linq report shows SVT and AF. Pt remains on Eliquis. EP physician to review.  We will continue to monitor remotely.

## 2021-08-02 ENCOUNTER — NURSE ONLY (OUTPATIENT)
Dept: CARDIOLOGY CLINIC | Age: 71
End: 2021-08-02
Payer: MEDICARE

## 2021-08-02 DIAGNOSIS — I47.1 ATRIAL TACHYCARDIA (HCC): ICD-10-CM

## 2021-08-02 DIAGNOSIS — Z45.09 ENCOUNTER FOR ELECTRONIC ANALYSIS OF REVEAL EVENT RECORDER: ICD-10-CM

## 2021-08-02 PROCEDURE — 93298 REM INTERROG DEV EVAL SCRMS: CPT | Performed by: INTERNAL MEDICINE

## 2021-08-02 PROCEDURE — G2066 INTER DEVC REMOTE 30D: HCPCS | Performed by: INTERNAL MEDICINE

## 2021-08-03 RX ORDER — APIXABAN 5 MG/1
TABLET, FILM COATED ORAL
Qty: 180 TABLET | Refills: 1 | Status: SHIPPED | OUTPATIENT
Start: 2021-08-03

## 2021-08-03 NOTE — TELEPHONE ENCOUNTER
Received refill request for Eliquis from [facility].     Last ov:10/01/2020 MXA    Last labs:04/02/2020 CMP in care everywhere    Last Refill:02/02/2021 #180 tabs w/1 refill    Next appointment:no future appointment

## 2021-08-17 ENCOUNTER — TELEPHONE (OUTPATIENT)
Dept: INTERNAL MEDICINE CLINIC | Age: 71
End: 2021-08-17

## 2021-08-17 ENCOUNTER — NURSE TRIAGE (OUTPATIENT)
Dept: OTHER | Facility: CLINIC | Age: 71
End: 2021-08-17

## 2021-08-17 ENCOUNTER — OFFICE VISIT (OUTPATIENT)
Dept: INTERNAL MEDICINE CLINIC | Age: 71
End: 2021-08-17
Payer: MEDICARE

## 2021-08-17 VITALS
HEIGHT: 68 IN | SYSTOLIC BLOOD PRESSURE: 130 MMHG | WEIGHT: 234.4 LBS | HEART RATE: 69 BPM | TEMPERATURE: 97.9 F | DIASTOLIC BLOOD PRESSURE: 84 MMHG | BODY MASS INDEX: 35.52 KG/M2

## 2021-08-17 DIAGNOSIS — K04.7 DENTAL INFECTION: Primary | ICD-10-CM

## 2021-08-17 DIAGNOSIS — I10 BENIGN ESSENTIAL HTN: ICD-10-CM

## 2021-08-17 PROCEDURE — 1123F ACP DISCUSS/DSCN MKR DOCD: CPT | Performed by: INTERNAL MEDICINE

## 2021-08-17 PROCEDURE — 1090F PRES/ABSN URINE INCON ASSESS: CPT | Performed by: INTERNAL MEDICINE

## 2021-08-17 PROCEDURE — G8417 CALC BMI ABV UP PARAM F/U: HCPCS | Performed by: INTERNAL MEDICINE

## 2021-08-17 PROCEDURE — G8427 DOCREV CUR MEDS BY ELIG CLIN: HCPCS | Performed by: INTERNAL MEDICINE

## 2021-08-17 PROCEDURE — 3017F COLORECTAL CA SCREEN DOC REV: CPT | Performed by: INTERNAL MEDICINE

## 2021-08-17 PROCEDURE — G8400 PT W/DXA NO RESULTS DOC: HCPCS | Performed by: INTERNAL MEDICINE

## 2021-08-17 PROCEDURE — 1036F TOBACCO NON-USER: CPT | Performed by: INTERNAL MEDICINE

## 2021-08-17 PROCEDURE — 4040F PNEUMOC VAC/ADMIN/RCVD: CPT | Performed by: INTERNAL MEDICINE

## 2021-08-17 PROCEDURE — 99213 OFFICE O/P EST LOW 20 MIN: CPT | Performed by: INTERNAL MEDICINE

## 2021-08-17 RX ORDER — CLINDAMYCIN HYDROCHLORIDE 300 MG/1
300 CAPSULE ORAL 3 TIMES DAILY
Qty: 21 CAPSULE | Refills: 0 | Status: SHIPPED | OUTPATIENT
Start: 2021-08-17 | End: 2021-08-24

## 2021-08-17 RX ORDER — CHLORHEXIDINE GLUCONATE 0.12 MG/ML
15 RINSE ORAL 2 TIMES DAILY
Qty: 420 ML | Refills: 0 | Status: SHIPPED | OUTPATIENT
Start: 2021-08-17 | End: 2021-08-31

## 2021-08-17 ASSESSMENT — ENCOUNTER SYMPTOMS: SINUS PRESSURE: 0

## 2021-08-17 NOTE — PROGRESS NOTES
ASSESSMENT/PLAN:  1. Dental infection  Assessment & Plan:   Advised patient will start antibiotic therapy along with chlorhexidine mouthwash however she needs to see a dentist as soon as possible especially if pain does not improve within 48 to 72 hours from starting antibiotics. Can use Tylenol and or NSAIDs for pain, advised if any fever or chills to call the office or seek immediate medical attention  Orders:  -     clindamycin (CLEOCIN) 300 MG capsule; Take 1 capsule by mouth 3 times daily for 7 days, Disp-21 capsule, R-0Normal  -     chlorhexidine (PERIDEX) 0.12 % solution; Take 15 mLs by mouth 2 times daily for 14 days, Disp-420 mL, R-0Normal  2. Benign essential HTN  Assessment & Plan:   Blood pressure is stable and well-controlled on current dose of Cardizem, she will continue the same and encouraged to schedule a follow-up appointment with Dr. Prudence Chester for routine checkup since it has been a while. He does however follow-up with cardiology regularly  Encouraged patient to get her Covid vaccine completed      Return for routine check up with Dr Prudence Chester. SUBJECTIVE  HPI:   Patient with pain and swelling right side lower jaw due to poor dentition, she does have history of multiple root canals however for the past 3 days started experiencing the pain and swelling and believes it is infected. She does not have a dentist    Dental Pain   This is a new problem. The current episode started in the past 7 days. The problem occurs constantly. The problem has been waxing and waning. The pain is at a severity of 7/10. Associated symptoms include facial pain. Pertinent negatives include no fever, oral bleeding, sinus pressure or thermal sensitivity. She has tried nothing for the symptoms. Review of Systems   Constitutional: Negative for activity change, chills and fever. HENT: Positive for dental problem. Negative for sinus pressure.         OBJECTIVE:    /84 (Site: Left Upper Arm, Position: Sitting, Cuff

## 2021-08-17 NOTE — TELEPHONE ENCOUNTER
Pt called asking to be seen today. She is having pain in the right side of her jaw. She reports that she has had the pain for 2 days. She feels this is a bad tooth infection. The tooth is very very painful. She doesn't have a dentist. She will call to see if she can get into a dentist. She is asking to get something sent in for the infection until she finds one. She is concerned about the infection.

## 2021-08-17 NOTE — ASSESSMENT & PLAN NOTE
Advised patient will start antibiotic therapy along with chlorhexidine mouthwash however she needs to see a dentist as soon as possible especially if pain does not improve within 48 to 72 hours from starting antibiotics.   Can use Tylenol and or NSAIDs for pain, advised if any fever or chills to call the office or seek immediate medical attention

## 2021-08-17 NOTE — TELEPHONE ENCOUNTER
Reason for Disposition   Face is swollen    Answer Assessment - Initial Assessment Questions  1. LOCATION: \"Which tooth is hurting? \"  (e.g., right-side/left-side, upper/lower, front/back)      Middle and lower    2. ONSET: \"When did the toothache start? \"  (e.g., hours, days)       2 days    3. SEVERITY: \"How bad is the toothache? \"  (Scale 1-10; mild, moderate or severe)    - MILD (1-3): doesn't interfere with chewing     - MODERATE (4-7): interferes with chewing, interferes with normal activities, awakens from sleep      - SEVERE (8-10): unable to eat, unable to do any normal activities, excruciating pain         Took aspirin and says has no pain now, but was having severe pain, wakes her up at night, stabbing pain    4. SWELLING: \"Is there any visible swelling of your face? \"      Right jaw-\"bottom of jaw\"    5. OTHER SYMPTOMS: \"Do you have any other symptoms? \" (e.g., fever)      No fever    6. PREGNANCY: \"Is there any chance you are pregnant? \" \"When was your last menstrual period? \"      N/a    Protocols used: TOOTHACHE-ADULT-OH    Received call from Korin at [facility] with Red Flag Complaint. Brief description of triage: jaw pain and swelling and has a tooth that is painful and     Triage indicates for patient to be seen today in office, call dentist and UCC as back up if not able to make appt in office today. Care advice provided, patient verbalizes understanding; denies any other questions or concerns; instructed to call back for any new or worsening symptoms. Writer provided warm transfer to Playerize at [facility] for appointment scheduling. Attention Provider: Thank you for allowing me to participate in the care of your patient. The patient was connected to triage in response to information provided to the ECC. Please do not respond through this encounter as the response is not directed to a shared pool.

## 2021-08-17 NOTE — ASSESSMENT & PLAN NOTE
Blood pressure is stable and well-controlled on current dose of Cardizem, she will continue the same and encouraged to schedule a follow-up appointment with Dr. Precious Cedillo for routine checkup since it has been a while.   He does however follow-up with cardiology regularly  Encouraged patient to get her Covid vaccine completed

## 2021-08-23 ENCOUNTER — TELEPHONE (OUTPATIENT)
Dept: CARDIOLOGY CLINIC | Age: 71
End: 2021-08-23

## 2021-08-23 NOTE — TELEPHONE ENCOUNTER
Dr. Smith Ahr calling to see if pt can stop taking Eliquis for 2-3 days prior to procedure. She is having multiple tooth extractions scheduled for 8/25. She took her last dose today and will resume on Thursday if that is okay. Pls call to advise. Thank you.

## 2021-08-23 NOTE — TELEPHONE ENCOUNTER
Called patient and relayed message below with verbal understanding from patient and encouragement to call office with any other questions.

## 2021-08-23 NOTE — TELEPHONE ENCOUNTER
Please call and let her know it is OK to hold [facility] as requested.      Geovanny Morley, APRN-CNP

## 2021-08-28 PROCEDURE — G2066 INTER DEVC REMOTE 30D: HCPCS | Performed by: INTERNAL MEDICINE

## 2021-08-28 PROCEDURE — 93298 REM INTERROG DEV EVAL SCRMS: CPT | Performed by: INTERNAL MEDICINE

## 2021-09-01 NOTE — PROGRESS NOTES
We received a remote transmission from patient's monitor at home. Remote Linq report shows short SVT. Pt showed no symptoms. Pt remains on Cardizem. EP to review. We will continue to follow remotely.

## 2021-09-02 ENCOUNTER — NURSE ONLY (OUTPATIENT)
Dept: CARDIOLOGY CLINIC | Age: 71
End: 2021-09-02

## 2021-09-02 DIAGNOSIS — Z45.09 ENCOUNTER FOR ELECTRONIC ANALYSIS OF REVEAL EVENT RECORDER: ICD-10-CM

## 2021-09-02 DIAGNOSIS — I47.1 ATRIAL TACHYCARDIA (HCC): ICD-10-CM

## 2021-10-04 RX ORDER — DILTIAZEM HYDROCHLORIDE 180 MG/1
CAPSULE, COATED, EXTENDED RELEASE ORAL
Qty: 180 CAPSULE | Refills: 0 | Status: SHIPPED | OUTPATIENT
Start: 2021-10-04 | End: 2022-01-07 | Stop reason: SDUPTHER

## 2021-10-04 NOTE — TELEPHONE ENCOUNTER
Received refill request for Diltiazem from [facility].      Last OV: 10/01/2020 w/ MXA    Last Refill:  10/01/2020 #180 w/ 3 refills     Next Appointment: was on recall list for appt on   05/29/2021

## 2021-10-06 ENCOUNTER — OFFICE VISIT (OUTPATIENT)
Dept: INTERNAL MEDICINE CLINIC | Age: 71
End: 2021-10-06
Payer: MEDICARE

## 2021-10-06 VITALS
SYSTOLIC BLOOD PRESSURE: 144 MMHG | HEART RATE: 84 BPM | DIASTOLIC BLOOD PRESSURE: 94 MMHG | OXYGEN SATURATION: 98 % | WEIGHT: 237.4 LBS | BODY MASS INDEX: 36.1 KG/M2

## 2021-10-06 DIAGNOSIS — E55.9 VITAMIN D INSUFFICIENCY: ICD-10-CM

## 2021-10-06 DIAGNOSIS — R31.0 GROSS HEMATURIA: Primary | ICD-10-CM

## 2021-10-06 DIAGNOSIS — C91.10 CLL (CHRONIC LYMPHOCYTIC LEUKEMIA) (HCC): ICD-10-CM

## 2021-10-06 DIAGNOSIS — I10 BENIGN ESSENTIAL HTN: ICD-10-CM

## 2021-10-06 DIAGNOSIS — I48.0 PAROXYSMAL A-FIB (HCC): ICD-10-CM

## 2021-10-06 DIAGNOSIS — J44.9 COPD, SEVERITY TO BE DETERMINED (HCC): ICD-10-CM

## 2021-10-06 DIAGNOSIS — E78.5 HYPERLIPIDEMIA LDL GOAL <100: ICD-10-CM

## 2021-10-06 DIAGNOSIS — Z71.85 VACCINE COUNSELING: ICD-10-CM

## 2021-10-06 PROBLEM — K04.7 DENTAL INFECTION: Status: RESOLVED | Noted: 2021-08-17 | Resolved: 2021-10-06

## 2021-10-06 LAB
BILIRUBIN, POC: NORMAL
BLOOD URINE, POC: NORMAL
CLARITY, POC: NORMAL
COLOR, POC: NORMAL
GLUCOSE URINE, POC: NORMAL
KETONES, POC: NORMAL
LEUKOCYTE EST, POC: NORMAL
NITRITE, POC: NORMAL
PH, POC: 5.5
PROTEIN, POC: NORMAL
SPECIFIC GRAVITY, POC: 1.01
UROBILINOGEN, POC: 0.2

## 2021-10-06 PROCEDURE — G8427 DOCREV CUR MEDS BY ELIG CLIN: HCPCS | Performed by: INTERNAL MEDICINE

## 2021-10-06 PROCEDURE — 3023F SPIROM DOC REV: CPT | Performed by: INTERNAL MEDICINE

## 2021-10-06 PROCEDURE — 3017F COLORECTAL CA SCREEN DOC REV: CPT | Performed by: INTERNAL MEDICINE

## 2021-10-06 PROCEDURE — G8400 PT W/DXA NO RESULTS DOC: HCPCS | Performed by: INTERNAL MEDICINE

## 2021-10-06 PROCEDURE — G8417 CALC BMI ABV UP PARAM F/U: HCPCS | Performed by: INTERNAL MEDICINE

## 2021-10-06 PROCEDURE — 1036F TOBACCO NON-USER: CPT | Performed by: INTERNAL MEDICINE

## 2021-10-06 PROCEDURE — 81002 URINALYSIS NONAUTO W/O SCOPE: CPT | Performed by: INTERNAL MEDICINE

## 2021-10-06 PROCEDURE — 99214 OFFICE O/P EST MOD 30 MIN: CPT | Performed by: INTERNAL MEDICINE

## 2021-10-06 PROCEDURE — 4040F PNEUMOC VAC/ADMIN/RCVD: CPT | Performed by: INTERNAL MEDICINE

## 2021-10-06 PROCEDURE — 1123F ACP DISCUSS/DSCN MKR DOCD: CPT | Performed by: INTERNAL MEDICINE

## 2021-10-06 PROCEDURE — G8926 SPIRO NO PERF OR DOC: HCPCS | Performed by: INTERNAL MEDICINE

## 2021-10-06 PROCEDURE — 1090F PRES/ABSN URINE INCON ASSESS: CPT | Performed by: INTERNAL MEDICINE

## 2021-10-06 PROCEDURE — G8484 FLU IMMUNIZE NO ADMIN: HCPCS | Performed by: INTERNAL MEDICINE

## 2021-10-06 ASSESSMENT — PATIENT HEALTH QUESTIONNAIRE - PHQ9
SUM OF ALL RESPONSES TO PHQ QUESTIONS 1-9: 0
SUM OF ALL RESPONSES TO PHQ9 QUESTIONS 1 & 2: 0
1. LITTLE INTEREST OR PLEASURE IN DOING THINGS: 0
2. FEELING DOWN, DEPRESSED OR HOPELESS: 0
SUM OF ALL RESPONSES TO PHQ QUESTIONS 1-9: 0
SUM OF ALL RESPONSES TO PHQ QUESTIONS 1-9: 0

## 2021-10-06 NOTE — PROGRESS NOTES
Patient: Matteo Gipson is a 65 y.o. female who presents today with the following Chief Complaint(s):  Chief Complaint   Patient presents with    Check-Up       HPI     Here today for follow up. Had some blood in her urine last night. Did bring in a urine sample. Urine last night was red, this morning looked like coffee. Denies any pain or burning with urination. Did not get her COVID vaccine d/t her Gnosticist. Has mj that God will protect her. States that of the 400 people in her [facility], no one has been diagnosed with COVID. CLL- remains off of treatment and is remains in remission. P. A.Fib- remains on Eliquis and Cardizem  mg BID. No issues with her heart. Declines flu vaccine.      Wt Readings from Last 3 Encounters:   10/06/21 237 lb 6.4 oz (107.7 kg)   08/17/21 234 lb 6.4 oz (106.3 kg)   03/30/20 213 lb (96.6 kg)     Temp Readings from Last 3 Encounters:   08/17/21 97.9 °F (36.6 °C) (Temporal)   05/29/19 98.3 °F (36.8 °C) (Oral)   10/22/18 98.6 °F (37 °C) (Oral)     BP Readings from Last 3 Encounters:   10/06/21 (!) 144/94   08/17/21 130/84   10/01/20 (!) 156/99     Pulse Readings from Last 3 Encounters:   10/06/21 84   08/17/21 69   10/01/20 68         Allergies   Allergen Reactions    Amoxicillin     Lisinopril Other (See Comments)     States it caused depression     Molds & Smuts     Penicillins       Past Medical History:   Diagnosis Date    Anemia     CLL (chronic lymphocytic leukemia) (HCC)     Hernia     Hypertension     Leukemia ([facility] [address]       Past Surgical History:   Procedure Laterality Date    APPENDECTOMY      HYSTERECTOMY      INSERTABLE CARDIAC MONITOR  05/28/2019    By Ludin Gee OVARY REMOVAL      VARICOSE VEIN SURGERY        Social History     Socioeconomic History    Marital status: Single     Spouse name: Not on file    Number of children: Not on file    Years of education: Not on file    Highest education level: Not on file   Occupational History  Not on file   Tobacco Use    Smoking status: Former Smoker     Packs/day: 1.00     Years: 52.00     Pack years: 52.00     Types: Cigarettes     Start date:      Quit date: 2019     Years since quittin.7    Smokeless tobacco: Never Used    Tobacco comment: started to smoke at 23 / smoked up to 1 ppd / less than 1 pack per week at end; never over 1ppd   Vaping Use    Vaping Use: Never used   Substance and Sexual Activity    Alcohol use: No    Drug use: No    Sexual activity: Not on file   Other Topics Concern    Not on file   Social History Narrative    Not on file     Social Determinants of Health     Financial Resource Strain:     Difficulty of Paying Living Expenses:    Food Insecurity:     Worried About Running Out of Food in the Last Year:     [address] in the Last Year:    Transportation Needs:     Lack of Transportation (Medical):      Lack of Transportation (Non-Medical):    Physical Activity:     Days of Exercise per Week:     Minutes of Exercise per Session:    Stress:     Feeling of Stress :    Social Connections:     Frequency of Communication with Friends and Family:     Frequency of Social Gatherings with Friends and Family:     Attends Islam Services:     Active Member of Clubs or Organizations:     Attends Club or Organization Meetings:     Marital Status:    Intimate Partner Violence:     Fear of Current or Ex-Partner:     Emotionally Abused:     Physically Abused:     Sexually Abused:      Family History   Problem Relation Age of Onset    Cancer Mother     Allergy (Severe) Mother     Cancer Father     Mental Illness Sister         Outpatient Medications Prior to Visit   Medication Sig Dispense Refill    dilTIAZem (CARDIZEM CD) 180 MG extended release capsule TAKE 1 CAPSULE BY MOUTH TWO TIMES A DAY  180 capsule 0    ELIQUIS 5 MG TABS tablet TAKE 1 TABLET BY MOUTH TWO TIMES A DAY  180 tablet 1    Ascorbic Acid (VITAMIN C) 500 MG tablet Take 500 mg by mouth daily       No facility-administered medications prior to visit. Patient'spast medical history, surgical history, family history, medications,  and allergies  were all reviewed and updated as appropriate today. Review of Systems   Constitutional: Negative for appetite change, fatigue and fever. Respiratory: Negative for chest tightness and shortness of breath. Cardiovascular: Negative for chest pain. Gastrointestinal: Negative for constipation and diarrhea. Skin: Negative for rash. BP (!) 144/94   Pulse 84   Wt 237 lb 6.4 oz (107.7 kg)   SpO2 98%   BMI 36.10 kg/m²   Physical Exam  Vitals and nursing note reviewed. Constitutional:       Appearance: She is well-developed. She is not toxic-appearing. HENT:      Head: Normocephalic. Right Ear: Tympanic membrane, ear canal and external ear normal.      Left Ear: Tympanic membrane, ear canal and external ear normal.      Mouth/Throat:      Pharynx: No oropharyngeal exudate or posterior oropharyngeal erythema. Eyes:      General: No scleral icterus. Extraocular Movements: Extraocular movements intact. Conjunctiva/sclera: Conjunctivae normal.      Pupils: Pupils are equal, round, and reactive to light. Neck:      Thyroid: No thyroid mass or thyromegaly. Vascular: No carotid bruit. Cardiovascular:      Rate and Rhythm: Normal rate and regular rhythm. Heart sounds: Normal heart sounds. No murmur heard. Pulmonary:      Effort: Pulmonary effort is normal.      Breath sounds: Normal breath sounds. Abdominal:      Palpations: Abdomen is soft. Tenderness: There is no abdominal tenderness. Musculoskeletal:         General: Swelling (non-pitting edema of ankles) present. Right lower leg: No edema. Left lower leg: No edema. Lymphadenopathy:      Cervical: No cervical adenopathy. Neurological:      General: No focal deficit present.       Mental Status: She is alert and oriented to person, place, and time. Psychiatric:         Mood and Affect: Mood normal.         Behavior: Behavior normal. Behavior is cooperative. ASSESSMENT/PLAN:    Problem List Items Addressed This Visit     Benign essential HTN      Blood pressure is elevated today. Continue Cardizem  mg twice daily. Monitor blood pressure at home. CLL (chronic lymphocytic leukemia) ([facility] [address]     Follows with Dr. Lydia Alonso. Per patient, she is in remission and remains off of treatment. Relevant Orders    CBC Auto Differential (Completed)    RESOLVED: COPD, severity to be determined ([facility] [address]    Gross hematuria - Primary      Check UA, send urine for culture and sensitivity. Referral placed to urology. Relevant Orders    PEREZ - Fco Medellin MD, Urology, [facility]    CBC Auto Differential (Completed)    COMPREHENSIVE METABOLIC PANEL (Completed)    URINALYSIS (Completed)    Culture, Urine (Completed)    PROTIME-INR (Completed)    POCT Urinalysis no Micro (Completed)    Hyperlipidemia LDL goal <100      On no medications. Check lipid panel. Statins have previously been recommended but she has declined. Relevant Orders    COMPREHENSIVE METABOLIC PANEL (Completed)    Lipid Panel (Completed)    TSH with Reflex (Completed)    Paroxysmal A-fib (HCC)     Remains on Cardizem  mg twice daily and Eliquis. Follows with Dr. Ruy Yan for cardiology. Relevant Orders    TSH with Reflex (Completed)    Vaccine counseling     Encouraged COVID-19 vaccination. Discussed importance of COVID-19 vaccination in terms of decreasing morbidity and preventing mortality. Discussed importance of COVID-19 vaccination in terms of controlling COVID-19 pandemic. Discussed safety of COVID-19 vaccines as well as potential side effects. Discussed scheduling COVID-19 vaccine through patient's pharmacy. Discussed increased risk of spread and severity of illness with delta variant.   Discussed importance of continued masking, good handwashing, and social distancing, particularly if unvaccinated. Declines flu vaccine. Reviewed that flu vaccine is a dead vaccine, that it will not give her the flu. Reviewed s/s of immune reaction (low-grade temp, headache, mild arthralgias lasting a few days following vaccine) is different than influenza. Reviewed potential significant morbidity and mortality associated with influenza. Continues to decline flu vaccine. Vitamin D insufficiency      Check vitamin D level. Continue weekly ergocalciferol. Relevant Orders    VITAMIN D 25 HYDROXY (Completed)          Current Outpatient Medications   Medication Sig Dispense Refill    dilTIAZem (CARDIZEM CD) 180 MG extended release capsule TAKE 1 CAPSULE BY MOUTH TWO TIMES A DAY  180 capsule 0    ELIQUIS 5 MG TABS tablet TAKE 1 TABLET BY MOUTH TWO TIMES A DAY  180 tablet 1    Ascorbic Acid (VITAMIN C) 500 MG tablet Take 500 mg by mouth daily       No current facility-administered medications for this visit. Return in about 6 months (around 4/6/2022) for sooner if needed. Bárbara Monique

## 2021-10-11 ENCOUNTER — NURSE ONLY (OUTPATIENT)
Dept: CARDIOLOGY CLINIC | Age: 71
End: 2021-10-11
Payer: MEDICARE

## 2021-10-11 DIAGNOSIS — Z45.09 ENCOUNTER FOR ELECTRONIC ANALYSIS OF REVEAL EVENT RECORDER: ICD-10-CM

## 2021-10-11 DIAGNOSIS — I47.1 ATRIAL TACHYCARDIA (HCC): ICD-10-CM

## 2021-10-11 PROCEDURE — G2066 INTER DEVC REMOTE 30D: HCPCS | Performed by: INTERNAL MEDICINE

## 2021-10-11 PROCEDURE — 93298 REM INTERROG DEV EVAL SCRMS: CPT | Performed by: INTERNAL MEDICINE

## 2021-10-17 PROBLEM — J44.9 COPD, SEVERITY TO BE DETERMINED (HCC): Status: RESOLVED | Noted: 2019-07-23 | Resolved: 2021-10-17

## 2021-10-17 PROBLEM — R31.0 GROSS HEMATURIA: Status: ACTIVE | Noted: 2021-10-17

## 2021-10-17 ASSESSMENT — ENCOUNTER SYMPTOMS
DIARRHEA: 0
SHORTNESS OF BREATH: 0
CONSTIPATION: 0
CHEST TIGHTNESS: 0

## 2021-10-17 NOTE — ASSESSMENT & PLAN NOTE
Encouraged COVID-19 vaccination. Discussed importance of COVID-19 vaccination in terms of decreasing morbidity and preventing mortality. Discussed importance of COVID-19 vaccination in terms of controlling COVID-19 pandemic. Discussed safety of COVID-19 vaccines as well as potential side effects. Discussed scheduling COVID-19 vaccine through patient's pharmacy. Discussed increased risk of spread and severity of illness with delta variant. Discussed importance of continued masking, good handwashing, and social distancing, particularly if unvaccinated. Declines flu vaccine. Reviewed that flu vaccine is a dead vaccine, that it will not give her the flu. Reviewed s/s of immune reaction (low-grade temp, headache, mild arthralgias lasting a few days following vaccine) is different than influenza. Reviewed potential significant morbidity and mortality associated with influenza. Continues to decline flu vaccine.

## 2021-10-17 NOTE — ASSESSMENT & PLAN NOTE
On no medications. Check lipid panel. Statins have previously been recommended but she has declined.

## 2021-10-17 NOTE — ASSESSMENT & PLAN NOTE
Blood pressure is elevated today. Continue Cardizem  mg twice daily. Monitor blood pressure at home.

## 2021-11-15 ENCOUNTER — NURSE ONLY (OUTPATIENT)
Dept: CARDIOLOGY CLINIC | Age: 71
End: 2021-11-15
Payer: MEDICARE

## 2021-11-15 DIAGNOSIS — I47.1 ATRIAL TACHYCARDIA (HCC): ICD-10-CM

## 2021-11-15 DIAGNOSIS — Z45.09 ENCOUNTER FOR ELECTRONIC ANALYSIS OF REVEAL EVENT RECORDER: ICD-10-CM

## 2021-11-16 PROCEDURE — G2066 INTER DEVC REMOTE 30D: HCPCS | Performed by: INTERNAL MEDICINE

## 2021-11-16 PROCEDURE — 93298 REM INTERROG DEV EVAL SCRMS: CPT | Performed by: INTERNAL MEDICINE

## 2021-11-16 NOTE — PROGRESS NOTES
We received a remote transmission from patient's monitor at home. Remote Linq report shows AF. Pt remains on Eliquis. EP physician to review.  We will continue to monitor remotely.

## 2021-12-18 PROCEDURE — 93298 REM INTERROG DEV EVAL SCRMS: CPT | Performed by: INTERNAL MEDICINE

## 2021-12-18 PROCEDURE — G2066 INTER DEVC REMOTE 30D: HCPCS | Performed by: INTERNAL MEDICINE

## 2021-12-20 ENCOUNTER — NURSE ONLY (OUTPATIENT)
Dept: CARDIOLOGY CLINIC | Age: 71
End: 2021-12-20
Payer: MEDICARE

## 2021-12-20 DIAGNOSIS — I47.1 ATRIAL TACHYCARDIA (HCC): ICD-10-CM

## 2021-12-20 DIAGNOSIS — Z45.09 ENCOUNTER FOR ELECTRONIC ANALYSIS OF REVEAL EVENT RECORDER: ICD-10-CM

## 2021-12-30 RX ORDER — DILTIAZEM HYDROCHLORIDE 180 MG/1
CAPSULE, COATED, EXTENDED RELEASE ORAL
Qty: 180 CAPSULE | Refills: 0 | OUTPATIENT
Start: 2021-12-30

## 2021-12-30 NOTE — TELEPHONE ENCOUNTER
Spoke to patient relayed message below.  Scheduled patient to see Marleen Orozco 1/12/2022 AT 10:30AM

## 2022-01-07 RX ORDER — DILTIAZEM HYDROCHLORIDE 180 MG/1
CAPSULE, COATED, EXTENDED RELEASE ORAL
Qty: 180 CAPSULE | Refills: 0 | Status: SHIPPED | OUTPATIENT
Start: 2022-01-07 | End: 2022-04-07

## 2022-01-11 NOTE — PROGRESS NOTES
[address]   Electrophysiology  MARTIN Abdul-CNP  Attending EP: Dr. Armin Santamaria  Date: 1/12/2022  I had the privilege of visiting Sarabjit Nettles in the office. Chief Complaint:   Chief Complaint   Patient presents with    1 Year Follow Up    Atrial Fibrillation     History of Present Illness: History obtained from patient and medical record. Sarabjit Nettles is 66 y.o. female with a past medical history of hypertension, CLL, anemia, and atrial fibrillation. 5/2019 she presented to the hospital with palpitations and fever. Found to be in AF/RVR at that time and converted spontaneously to SR. S/p ILR 5/28/2019. Interval history: Today, Sarabjit Nettles is being seen for PAF and hypertension. Reports that she is feeling well from cardiac perspective. She has had 2 episodes of PAF since her last follow up. Once she does not recall and then other occurring on 10/12/2021 which she states was due to emotional stress over the loss of her cat. Admits to LE swelling that is intermittent that is controlled compression stockings. She is a , cooks and reads in her spare time that she has not had any issues with activity tolerance. Admits to weight gain, she is trying to adjust her diet to lose weight. No acute complaints at today's visit. With regard to medication therapy the patient has been compliant with prescribed regimen. She has tolerated therapy to date. Assessment:  Paroxysmal Atrial Fibrillation  - ECG today shows SR  - Continue diltiazem 180 mg daily   - [id number] score: 3 (age, gender, hypertension) ; [id number] Vasc score and anticoagulation discussed. High risk for stroke and thromboembolism. Anticoagulation is recommended.   ~ Continue Eliquis 5 mg bid, no s/s of bleeding  - Afib risk factors including age, HTN, obesity, inactivity and SAEED were discussed with patient.  Risk factor modification recommended   ~ TSH 1.01 (10/6/2021)     - Treatment options including cardioversion, rate control strategy, antiarrhythmics, anticoagulation and possible ablation were discussed with patient. Risks, benefits and alternative of each treatment options were explained. All questions answered    ~ Continues with low burden of AF, will monitor, she is not interested in invasive procedure of AAD therapies at this time  pSVT   - Hx of PAT   - Asymptomatic and brief   - Continue diltiazem 180 mg daily    - Repeat echocardiogram  HTN-goal <130/80   - Controlled   - continue   - Encouraged patient to check BP at home, log and bring to office visits  - Discussed lifestyle modifications, weight loss, low sodium diet  Implantable Loop Recorder  - S/p ILR insertion on 5/28/2019   -The CIED was interrogated and programmed and I supervised and reviewed all the data. All findings and changes are in device interrogation sheat and reflect my personal interpretation and changes and is scanned to Epic  - Device shows: pSVT brief and atrial fibrillation 10/2021 lasting 6 hours  - Follow up with device clinic as scheduled  CLL   - Follows with oncology  Plan  - No medication changes  - Call office if symptoms develop  - She does not want to have ILR replaced, she will have it removed when it reaches EOS    F/U: Follow-up with EP in 6 months   Follow up with device clinic as scheduled  Call [facility] at [phone] with any questions    Allergies: Allergies   Allergen Reactions    Amoxicillin     Lisinopril Other (See Comments)     States it caused depression     Molds & Smuts     Penicillins      Home Medications:  Prior to Visit Medications    Medication Sig Taking?  Authorizing Provider   dilTIAZem (CARDIZEM CD) 180 MG extended release capsule TAKE 1 CAPSULE BY MOUTH TWO TIMES A DAY  Annamaria Finger, APRN - CNP   ELIQUIS 5 MG TABS tablet TAKE 1 TABLET BY MOUTH TWO TIMES A DAY   Annamaria Finger, APRN - CNP   ELIQUIS 5 MG TABS tablet TAKE 1 TABLET BY MOUTH TWO TIMES A DAY   Annamaria Finger, and normocephalic. Conjunctiva pink with clear sclera. Mucus membranes pink and moist. No visible masses/goiter   Respiratory: Respirations symmetric and unlabored. Lungs clear to auscultation bilaterally, no wheezing, rhonchi, or crackles.  Cardiovascular:  regular rate and rhythm. S1 & S2 present, negative for murmur. negative elevation of JVP. No peripheral edema.  Musculoskeletal:  No focal weakness.  Neurological/Psych: Awake and orientated to person, place and time. Calm affect, appropriate mood. Pertinent labs, diagnostic, device, and imaging results reviewed as a part of this visit    LABS    CBC:   Lab Results   Component Value Date    WBC 7.6 10/06/2021    HGB 14.9 10/06/2021    HCT 45.6 10/06/2021    MCV 86.0 10/06/2021     10/06/2021     BMP:   Lab Results   Component Value Date    CREATININE 0.9 10/06/2021    BUN 24 (H) 10/06/2021     10/06/2021    K 4.7 10/06/2021     10/06/2021    CO2 23 10/06/2021     CrCl cannot be calculated (Unknown ideal weight.). Lab Results   Component Value Date    BNP <15 2011       Thyroid:   Lab Results   Component Value Date    TSH 1.72 2019     Lipid Panel:   Lab Results   Component Value Date    CHOL 257 10/06/2021    HDL 65 10/06/2021    TRIG 268 10/06/2021     LFTs:  Lab Results   Component Value Date    ALT 18 10/06/2021    AST 24 10/06/2021    ALKPHOS 99 10/06/2021    BILITOT <0.2 10/06/2021     Coags:   Lab Results   Component Value Date    PROTIME 12.4 10/06/2021    INR 1.09 10/06/2021     EC2022 SR HR 77, , , QTc 417    Echo: 2019    Summary   Normal left ventricle size and systolic function with an estimated ejection   fraction of 65-70%. There is mild concentric left ventricular hypertrophy. Mild mitral regurgitation. Mild tricuspid regurgitation with PASP of 35-40 mmHg. There is a small circumferential pericardial effusion noted.   GXT: none recent    Cath: none recent    Diet & Exercise:   The patient is counseled to follow a low salt diet to assure blood pressure remains controlled for cardiovascular risk factor modification   The patient is counseled to avoid excess caffeine, and energy drinks as this may exacerbated ectopy and arrhythmia   The patient is counseled to lose weight to control cardiovascular risk factors   Exercise program discussed: To improve overall cardiovascular health, the patient is instructed to increase cardiovascular related activities with a goal of 150 min/week of moderate level activity or 10,000 steps per day. Encouraged to perform as much activity as tolerated     I have addressed the patient's cardiac risk factors and adjusted pharmacologic treatment as needed. In addition, I have reinforced the need for patient directed risk factor modification. I independently reviewed the device check interrogation and ECG    All questions and concerns were addressed with the patient. Alternatives to treatment were discussed. Thank you for allowing to us to participate in the care of Zee Perez.     Ghada Goldberg, APRN-CNP  [facility]   Office: [phone]

## 2022-01-12 ENCOUNTER — NURSE ONLY (OUTPATIENT)
Dept: CARDIOLOGY CLINIC | Age: 72
End: 2022-01-12

## 2022-01-12 ENCOUNTER — OFFICE VISIT (OUTPATIENT)
Dept: CARDIOLOGY CLINIC | Age: 72
End: 2022-01-12
Payer: MEDICARE

## 2022-01-12 VITALS
OXYGEN SATURATION: 97 % | HEART RATE: 75 BPM | SYSTOLIC BLOOD PRESSURE: 108 MMHG | WEIGHT: 237 LBS | BODY MASS INDEX: 35.92 KG/M2 | DIASTOLIC BLOOD PRESSURE: 76 MMHG | HEIGHT: 68 IN

## 2022-01-12 DIAGNOSIS — Z45.09 ENCOUNTER FOR ELECTRONIC ANALYSIS OF REVEAL EVENT RECORDER: ICD-10-CM

## 2022-01-12 DIAGNOSIS — I48.0 PAF (PAROXYSMAL ATRIAL FIBRILLATION) (HCC): Primary | ICD-10-CM

## 2022-01-12 DIAGNOSIS — E66.01 SEVERE OBESITY (BMI 35.0-35.9 WITH COMORBIDITY) (HCC): ICD-10-CM

## 2022-01-12 DIAGNOSIS — I10 PRIMARY HYPERTENSION: ICD-10-CM

## 2022-01-12 DIAGNOSIS — R94.31 ABNORMAL ECG: ICD-10-CM

## 2022-01-12 PROCEDURE — G8417 CALC BMI ABV UP PARAM F/U: HCPCS | Performed by: NURSE PRACTITIONER

## 2022-01-12 PROCEDURE — 1036F TOBACCO NON-USER: CPT | Performed by: NURSE PRACTITIONER

## 2022-01-12 PROCEDURE — 99214 OFFICE O/P EST MOD 30 MIN: CPT | Performed by: NURSE PRACTITIONER

## 2022-01-12 PROCEDURE — 93000 ELECTROCARDIOGRAM COMPLETE: CPT | Performed by: NURSE PRACTITIONER

## 2022-01-12 PROCEDURE — G8484 FLU IMMUNIZE NO ADMIN: HCPCS | Performed by: NURSE PRACTITIONER

## 2022-01-12 PROCEDURE — G8400 PT W/DXA NO RESULTS DOC: HCPCS | Performed by: NURSE PRACTITIONER

## 2022-01-12 PROCEDURE — G8427 DOCREV CUR MEDS BY ELIG CLIN: HCPCS | Performed by: NURSE PRACTITIONER

## 2022-01-12 PROCEDURE — 1090F PRES/ABSN URINE INCON ASSESS: CPT | Performed by: NURSE PRACTITIONER

## 2022-01-12 PROCEDURE — 4040F PNEUMOC VAC/ADMIN/RCVD: CPT | Performed by: NURSE PRACTITIONER

## 2022-01-12 PROCEDURE — 1123F ACP DISCUSS/DSCN MKR DOCD: CPT | Performed by: NURSE PRACTITIONER

## 2022-01-12 PROCEDURE — 3017F COLORECTAL CA SCREEN DOC REV: CPT | Performed by: NURSE PRACTITIONER

## 2022-01-12 NOTE — PROGRESS NOTES
Patient comes in for interrogation of their implanted loop recorder. Interrogation shows AF with a <0.1% burden. Last episodes noted on 10/12/2021 lasting 6 1/2 hours. 83 Tachy episodes. Last on 10/5/2021, longest 2 1/2 minutes with a Max V rate of 222 bpm. Patient remains on Eliquis and Cardizem. Implanted for palpitations and AF. Today we turned AF episodes to All. Patient will see Jean Castro today and we will continue to follow Patient remotely.

## 2022-01-12 NOTE — PATIENT INSTRUCTIONS
- No medication changes  - Echocardiogram  - Check your blood pressure at home, if your top number blood pressure is >140/90 or <95/50 please call the office  - Check your heart rate at home, if it is <50 or >120 please call the office   - No added salt   - Follow up in 6 months

## 2022-01-24 ENCOUNTER — NURSE ONLY (OUTPATIENT)
Dept: CARDIOLOGY CLINIC | Age: 72
End: 2022-01-24
Payer: MEDICARE

## 2022-01-24 DIAGNOSIS — Z45.09 ENCOUNTER FOR ELECTRONIC ANALYSIS OF REVEAL EVENT RECORDER: ICD-10-CM

## 2022-01-24 DIAGNOSIS — I47.1 ATRIAL TACHYCARDIA (HCC): ICD-10-CM

## 2022-01-24 PROCEDURE — G2066 INTER DEVC REMOTE 30D: HCPCS | Performed by: INTERNAL MEDICINE

## 2022-01-24 PROCEDURE — 93298 REM INTERROG DEV EVAL SCRMS: CPT | Performed by: INTERNAL MEDICINE

## 2022-02-26 PROCEDURE — G2066 INTER DEVC REMOTE 30D: HCPCS | Performed by: INTERNAL MEDICINE

## 2022-02-26 PROCEDURE — 93298 REM INTERROG DEV EVAL SCRMS: CPT | Performed by: INTERNAL MEDICINE

## 2022-02-28 ENCOUNTER — NURSE ONLY (OUTPATIENT)
Dept: CARDIOLOGY CLINIC | Age: 72
End: 2022-02-28
Payer: MEDICARE

## 2022-02-28 DIAGNOSIS — I47.1 ATRIAL TACHYCARDIA (HCC): ICD-10-CM

## 2022-02-28 DIAGNOSIS — Z45.09 ENCOUNTER FOR ELECTRONIC ANALYSIS OF REVEAL EVENT RECORDER: ICD-10-CM

## 2022-04-02 PROCEDURE — 93298 REM INTERROG DEV EVAL SCRMS: CPT | Performed by: INTERNAL MEDICINE

## 2022-04-02 PROCEDURE — G2066 INTER DEVC REMOTE 30D: HCPCS | Performed by: INTERNAL MEDICINE

## 2022-04-04 ENCOUNTER — NURSE ONLY (OUTPATIENT)
Dept: CARDIOLOGY CLINIC | Age: 72
End: 2022-04-04
Payer: MEDICARE

## 2022-04-04 DIAGNOSIS — Z45.09 ENCOUNTER FOR ELECTRONIC ANALYSIS OF REVEAL EVENT RECORDER: ICD-10-CM

## 2022-04-04 DIAGNOSIS — I48.0 PAROXYSMAL A-FIB (HCC): ICD-10-CM

## 2022-04-07 RX ORDER — DILTIAZEM HYDROCHLORIDE 180 MG/1
CAPSULE, COATED, EXTENDED RELEASE ORAL
Qty: 180 CAPSULE | Refills: 1 | Status: SHIPPED | OUTPATIENT
Start: 2022-04-07 | End: 2022-10-05 | Stop reason: SDUPTHER

## 2022-04-07 NOTE — TELEPHONE ENCOUNTER
Received refill request for Diltiazem from [facility] Miller.     Last ov: 2022 NPAL    Last EK2022    Last Refill: 2022 #180 w/ 0 refills    Next appointment: 2022 TEOFILO

## 2022-05-07 PROCEDURE — G2066 INTER DEVC REMOTE 30D: HCPCS | Performed by: INTERNAL MEDICINE

## 2022-05-07 PROCEDURE — 93298 REM INTERROG DEV EVAL SCRMS: CPT | Performed by: INTERNAL MEDICINE

## 2022-05-09 ENCOUNTER — NURSE ONLY (OUTPATIENT)
Dept: CARDIOLOGY CLINIC | Age: 72
End: 2022-05-09
Payer: MEDICARE

## 2022-05-09 DIAGNOSIS — Z45.09 ENCOUNTER FOR ELECTRONIC ANALYSIS OF REVEAL EVENT RECORDER: ICD-10-CM

## 2022-05-09 DIAGNOSIS — I47.1 ATRIAL TACHYCARDIA (HCC): ICD-10-CM

## 2022-06-13 ENCOUNTER — NURSE ONLY (OUTPATIENT)
Dept: CARDIOLOGY CLINIC | Age: 72
End: 2022-06-13
Payer: MEDICARE

## 2022-06-13 DIAGNOSIS — Z45.09 ENCOUNTER FOR ELECTRONIC ANALYSIS OF REVEAL EVENT RECORDER: ICD-10-CM

## 2022-06-13 DIAGNOSIS — I47.1 ATRIAL TACHYCARDIA (HCC): ICD-10-CM

## 2022-06-14 PROCEDURE — G2066 INTER DEVC REMOTE 30D: HCPCS | Performed by: INTERNAL MEDICINE

## 2022-06-14 PROCEDURE — 93298 REM INTERROG DEV EVAL SCRMS: CPT | Performed by: INTERNAL MEDICINE

## 2022-07-01 NOTE — PROGRESS NOTES
[address]   Electrophysiology Follow up   Date: 7/13/2022  I had the privilege of visiting Mery Alvarado in the office. CC:  Atrial fibrillation   HPI: Mery Alvarado is a 66 y.o. female  female with a PMH of CLL which was treated with chemo , and HTN. She was seen in the hospital 5/2019 after presenting  with a fever and palpitations. She was found to have Afib with RVR but converted to SR spontaneously. An ILR was implanted during her hospital stay to monitor for further arrhythmia.        Six month f/u LOV 10/2021 NPAL     Interval History:  Ron Kelsey presents today in follow up. Patient denies lightheadedness, dizziness, chest pain, palpitations, orthopnea, edema, presyncope or syncope. She states her CLL is under control. She states her feet swell left > right. They are fine in the morning and then increase as the day goes on. She states she briefly stopped her cardizem on a trip and did not have any swelling      Assessment and plan:   Atrial fibrillation with RVR    - ekg today declined    - continue cardizem 180 mg daily    - Patient has a [id number] Score of 3  ( age, gender,HTN)    ~ continue Eliquis 5 mg BID    ~ Creatinine 10/06/2021 0.9     -TSH 10/06/2021 1.01   - Afib risk factors including age, HTN, obesity, inactivity and SAEED were discussed with patient. Risk factor modification recommended  -  - Treatment options including cardioversion, rate control strategy, antiarrhythmics, anticoagulation and possible ablation were discussed with patient. Risks, benefits and alternative of each treatment options were explained. All questions answered   - she has not been interested in invasive procedure or AAD to date   She has not had any recurrence of atrial fibrillation since her last visit. She is not interested in replacing her monitor. We will continue to monitor medically and treat her with medications. She is not interested in any invasive procedure.     Lower extremity edema, right more than left    Multifactorial possibly due to venous insufficiency and also side effect of diltiazem. I explained it to her and she thinks that when she was not taking diltiazem should not have swelling. However she does not want to switch from diltiazem to any other medicine. Elevation of the leg and Ace wraps can be helpful. Supraventricular Tachycardia/Atrial Tachycardia              - Asymptomatic and brief              - Continue diltiazem 180 mg daily    - repeat echo was 01/2022 - not complete     S/p implantable loop recorder 05/28/2019    The CIED was interrogated and programmed and I supervised and reviewed all the data. All findings and changes are in device interrogation sheet and reflect my personal interpretation and changes and is scanned to Epic.    - no atrial fibrillation since mid January  - battery good    - does not want replace when it is ASHLEY       CLL   Follows with oncology       HTN  - Borderline 138/80 -   -BP goal <130/80  -Home BP monitoring encouraged, printed information provided on how to accurately measure BP at home.  -Counseled to follow a low salt diet to assure blood pressure remains controlled for cardiovascular risk factor modification.   -The patient is counseled to get regular exercise 3-5 times per week and maintain a healthy weight reduce cardiovascular risk factors. Continue current management.     Plan:   Follow up in  9 months with Me with device      Patient Active Problem List    Diagnosis Date Noted   Imelda Henriquez hematuria 10/17/2021    Atrial tachycardia ([facility] 03/30/2020    Paroxysmal A-fib ([facility] 07/04/2019    Encounter for electronic analysis of reveal event recorder 05/30/2019    Hyperlipidemia LDL goal <100 04/01/2019    Seasonal allergies 10/22/2018    Vaccine counseling 10/02/2018    Benign essential HTN 04/02/2018    Vitamin D insufficiency 04/02/2018    Obesity (BMI 35.0-39.9 without comorbidity) 09/16/2016    CLL (chronic lymphocytic leukemia) ([facility] 09/28/2015     Diagnostic studies:   ECG 7/13/22 - declined      Echo: 5/28/19   Summary   Normal left ventricle size and systolic function with an estimated ejection   fraction of 65-70%.   There is mild concentric left ventricular hypertrophy.   Mild mitral regurgitation.   Mild tricuspid regurgitation with PASP of 35-40 mmHg.   There is a small circumferential pericardial effusion no    I independently reviewed the cardiac diagnostic studies, ECG and relevant imaging studies. Lab Results   Component Value Date    LVEF 68 05/28/2019     Lab Results   Component Value Date    TSH 1.72 05/27/2019         Physical Examination:  Vitals:    07/13/22 1307   BP: 138/80   Pulse:    SpO2:       Wt Readings from Last 3 Encounters:   07/13/22 228 lb 9.6 oz (103.7 kg)   01/12/22 237 lb (107.5 kg)   10/06/21 237 lb 6.4 oz (107.7 kg)       · Constitutional: Oriented. No distress. · Head: Normocephalic and atraumatic. · Mouth/Throat: Oropharynx is clear and moist.   · Eyes: Conjunctivae normal. EOM are normal.   · Neck: Neck supple. No rigidity. No JVD present. · Cardiovascular: Normal rate, regular rhythm, S1&S2. · Pulmonary/Chest: Bilateral respiratory sounds. No wheezes, No rhonchi. · Abdominal: Soft. Bowel sounds present. No distension, No tenderness. · Musculoskeletal: No tenderness. edema  R>L  · Lymphadenopathy: Has no cervical adenopathy. · Neurological: Alert and oriented. Cranial nerve appears intact, No Gross deficit   · Skin: Skin is warm and dry. No rash noted. · Psychiatric: Has a normal behavior       Review of System:  [x] Full ROS obtained and negative except as mentioned in HPI    Prior to Admission medications    Medication Sig Start Date End Date Taking?  Authorizing Provider   dilTIAZem (CARDIZEM CD) 180 MG extended release capsule TAKE 1 CAPSULE BY MOUTH TWO TIMES A DAY 4/7/22  Yes MARTIN Reese - CNP   ELIQUIS 5 MG TABS tablet TAKE 1 TABLET BY MOUTH TWO TIMES A presence of  Felix Meyer MD by Raul Blanton RN    I, Dr. Felix Meyer personally performed the services described in this documentation as scribed by RN in my presence, and it is both accurate and complete.        NOTE: This report was transcribed using voice recognition software. Every effort was made to ensure accuracy, however, inadvertent computerized transcription errors may be present.      Felix Meyer MD, Rosalia Chaves [address] [facility]   Office: [phone]  Fax: [phone]

## 2022-07-12 NOTE — PROGRESS NOTES
Patient comes in for interrogation of their implanted loop recorder. Interrogation shows Battery Status Good. No arrhythmias/episodes noted. Patient remains on Eliquis and Cardizem. Implanted for palpitations and AF. Patient will see Dr. Radha Vargas today and we will continue to follow Patient remotely.

## 2022-07-13 ENCOUNTER — OFFICE VISIT (OUTPATIENT)
Dept: CARDIOLOGY CLINIC | Age: 72
End: 2022-07-13
Payer: MEDICARE

## 2022-07-13 ENCOUNTER — NURSE ONLY (OUTPATIENT)
Dept: CARDIOLOGY CLINIC | Age: 72
End: 2022-07-13

## 2022-07-13 VITALS
OXYGEN SATURATION: 96 % | HEIGHT: 68 IN | BODY MASS INDEX: 34.65 KG/M2 | SYSTOLIC BLOOD PRESSURE: 138 MMHG | DIASTOLIC BLOOD PRESSURE: 80 MMHG | WEIGHT: 228.6 LBS | HEART RATE: 77 BPM

## 2022-07-13 DIAGNOSIS — R60.9 EDEMA, UNSPECIFIED TYPE: ICD-10-CM

## 2022-07-13 DIAGNOSIS — I47.1 ATRIAL TACHYCARDIA (HCC): ICD-10-CM

## 2022-07-13 DIAGNOSIS — I10 BENIGN ESSENTIAL HTN: ICD-10-CM

## 2022-07-13 DIAGNOSIS — I48.0 PAROXYSMAL A-FIB (HCC): Primary | ICD-10-CM

## 2022-07-13 DIAGNOSIS — Z45.09 ENCOUNTER FOR ELECTRONIC ANALYSIS OF REVEAL EVENT RECORDER: ICD-10-CM

## 2022-07-13 PROCEDURE — 1090F PRES/ABSN URINE INCON ASSESS: CPT | Performed by: INTERNAL MEDICINE

## 2022-07-13 PROCEDURE — 3017F COLORECTAL CA SCREEN DOC REV: CPT | Performed by: INTERNAL MEDICINE

## 2022-07-13 PROCEDURE — G8400 PT W/DXA NO RESULTS DOC: HCPCS | Performed by: INTERNAL MEDICINE

## 2022-07-13 PROCEDURE — 99214 OFFICE O/P EST MOD 30 MIN: CPT | Performed by: INTERNAL MEDICINE

## 2022-07-13 PROCEDURE — 1123F ACP DISCUSS/DSCN MKR DOCD: CPT | Performed by: INTERNAL MEDICINE

## 2022-07-13 PROCEDURE — G8427 DOCREV CUR MEDS BY ELIG CLIN: HCPCS | Performed by: INTERNAL MEDICINE

## 2022-07-13 PROCEDURE — 1036F TOBACCO NON-USER: CPT | Performed by: INTERNAL MEDICINE

## 2022-07-13 PROCEDURE — G8417 CALC BMI ABV UP PARAM F/U: HCPCS | Performed by: INTERNAL MEDICINE

## 2022-07-18 ENCOUNTER — NURSE ONLY (OUTPATIENT)
Dept: CARDIOLOGY CLINIC | Age: 72
End: 2022-07-18
Payer: MEDICARE

## 2022-07-18 DIAGNOSIS — Z45.09 ENCOUNTER FOR ELECTRONIC ANALYSIS OF REVEAL EVENT RECORDER: ICD-10-CM

## 2022-07-18 DIAGNOSIS — I48.0 PAROXYSMAL A-FIB (HCC): Primary | ICD-10-CM

## 2022-07-18 PROCEDURE — G2066 INTER DEVC REMOTE 30D: HCPCS | Performed by: INTERNAL MEDICINE

## 2022-07-18 PROCEDURE — 93298 REM INTERROG DEV EVAL SCRMS: CPT | Performed by: INTERNAL MEDICINE

## 2022-07-18 NOTE — PROGRESS NOTES
We received a remote transmission from patient's monitor at home. Remote Linq report shows no arrhythmias. EP physician to review. We will continue to monitor remotely. End of 31-day monitoring period 7-18-22.

## 2022-08-22 ENCOUNTER — NURSE ONLY (OUTPATIENT)
Dept: CARDIOLOGY CLINIC | Age: 72
End: 2022-08-22
Payer: MEDICARE

## 2022-08-22 DIAGNOSIS — I48.0 PAROXYSMAL A-FIB (HCC): Primary | ICD-10-CM

## 2022-08-22 DIAGNOSIS — Z45.09 ENCOUNTER FOR ELECTRONIC ANALYSIS OF REVEAL EVENT RECORDER: ICD-10-CM

## 2022-08-22 PROCEDURE — G2066 INTER DEVC REMOTE 30D: HCPCS | Performed by: INTERNAL MEDICINE

## 2022-08-22 PROCEDURE — 93298 REM INTERROG DEV EVAL SCRMS: CPT | Performed by: INTERNAL MEDICINE

## 2022-08-22 NOTE — PROGRESS NOTES
We received a remote transmission from patient's monitor at home. Remote Linq report shows no arrhythmias. EP physician to review. We will continue to monitor remotely. Implanted for AF management. Pt is on Eliquis. End of 31-day monitoring period 8-22-22.

## 2022-09-26 ENCOUNTER — NURSE ONLY (OUTPATIENT)
Dept: CARDIOLOGY CLINIC | Age: 72
End: 2022-09-26
Payer: MEDICARE

## 2022-09-26 DIAGNOSIS — Z45.09 ENCOUNTER FOR ELECTRONIC ANALYSIS OF REVEAL EVENT RECORDER: ICD-10-CM

## 2022-09-26 DIAGNOSIS — I48.0 PAROXYSMAL A-FIB (HCC): Primary | ICD-10-CM

## 2022-09-26 PROCEDURE — 93298 REM INTERROG DEV EVAL SCRMS: CPT | Performed by: INTERNAL MEDICINE

## 2022-09-26 PROCEDURE — G2066 INTER DEVC REMOTE 30D: HCPCS | Performed by: INTERNAL MEDICINE

## 2022-09-27 NOTE — PROGRESS NOTES
We received a remote transmission from patient's monitor at home. Remote Linq report shows no arrhythmias. EP physician to review. We will continue to monitor remotely. Implanted for AF management. Pt is on Eliquis. End of 31-day monitoring period 9-26-22.

## 2022-10-05 RX ORDER — DILTIAZEM HYDROCHLORIDE 180 MG/1
CAPSULE, COATED, EXTENDED RELEASE ORAL
Qty: 180 CAPSULE | Refills: 0 | Status: SHIPPED | OUTPATIENT
Start: 2022-10-05

## 2022-10-31 ENCOUNTER — NURSE ONLY (OUTPATIENT)
Dept: CARDIOLOGY CLINIC | Age: 72
End: 2022-10-31
Payer: MEDICARE

## 2022-10-31 DIAGNOSIS — I48.0 PAROXYSMAL A-FIB (HCC): Primary | ICD-10-CM

## 2022-10-31 DIAGNOSIS — Z45.09 ENCOUNTER FOR ELECTRONIC ANALYSIS OF REVEAL EVENT RECORDER: ICD-10-CM

## 2022-10-31 PROCEDURE — G2066 INTER DEVC REMOTE 30D: HCPCS | Performed by: INTERNAL MEDICINE

## 2022-10-31 PROCEDURE — 93298 REM INTERROG DEV EVAL SCRMS: CPT | Performed by: INTERNAL MEDICINE

## 2022-11-01 NOTE — PROGRESS NOTES
We received a remote transmission from patient's monitor at home. Remote Linq report shows no arrhythmias. EP physician to review. We will continue to monitor remotely. Implanted for AF management. Pt is on Eliquis. End of 31-day monitoring period 10-31-22.

## 2022-11-28 ENCOUNTER — TELEMEDICINE (OUTPATIENT)
Dept: INTERNAL MEDICINE CLINIC | Age: 72
End: 2022-11-28
Payer: MEDICARE

## 2022-11-28 DIAGNOSIS — Z00.00 INITIAL MEDICARE ANNUAL WELLNESS VISIT: Primary | ICD-10-CM

## 2022-11-28 DIAGNOSIS — H91.90 HEARING LOSS, UNSPECIFIED HEARING LOSS TYPE, UNSPECIFIED LATERALITY: ICD-10-CM

## 2022-11-28 PROCEDURE — G8484 FLU IMMUNIZE NO ADMIN: HCPCS | Performed by: INTERNAL MEDICINE

## 2022-11-28 PROCEDURE — 3017F COLORECTAL CA SCREEN DOC REV: CPT | Performed by: INTERNAL MEDICINE

## 2022-11-28 PROCEDURE — 1123F ACP DISCUSS/DSCN MKR DOCD: CPT | Performed by: INTERNAL MEDICINE

## 2022-11-28 PROCEDURE — G0438 PPPS, INITIAL VISIT: HCPCS | Performed by: INTERNAL MEDICINE

## 2022-11-28 SDOH — ECONOMIC STABILITY: FOOD INSECURITY: WITHIN THE PAST 12 MONTHS, YOU WORRIED THAT YOUR FOOD WOULD RUN OUT BEFORE YOU GOT MONEY TO BUY MORE.: NEVER TRUE

## 2022-11-28 SDOH — ECONOMIC STABILITY: FOOD INSECURITY: WITHIN THE PAST 12 MONTHS, THE FOOD YOU BOUGHT JUST DIDN'T LAST AND YOU DIDN'T HAVE MONEY TO GET MORE.: NEVER TRUE

## 2022-11-28 ASSESSMENT — PATIENT HEALTH QUESTIONNAIRE - PHQ9
SUM OF ALL RESPONSES TO PHQ9 QUESTIONS 1 & 2: 0
1. LITTLE INTEREST OR PLEASURE IN DOING THINGS: 0
SUM OF ALL RESPONSES TO PHQ QUESTIONS 1-9: 0
SUM OF ALL RESPONSES TO PHQ QUESTIONS 1-9: 0
2. FEELING DOWN, DEPRESSED OR HOPELESS: 0
SUM OF ALL RESPONSES TO PHQ QUESTIONS 1-9: 0
SUM OF ALL RESPONSES TO PHQ QUESTIONS 1-9: 0

## 2022-11-28 ASSESSMENT — LIFESTYLE VARIABLES
HOW MANY STANDARD DRINKS CONTAINING ALCOHOL DO YOU HAVE ON A TYPICAL DAY: PATIENT DOES NOT DRINK
HOW OFTEN DO YOU HAVE A DRINK CONTAINING ALCOHOL: NEVER

## 2022-11-28 ASSESSMENT — SOCIAL DETERMINANTS OF HEALTH (SDOH): HOW HARD IS IT FOR YOU TO PAY FOR THE VERY BASICS LIKE FOOD, HOUSING, MEDICAL CARE, AND HEATING?: NOT HARD AT ALL

## 2022-11-28 NOTE — Clinical Note
Can you please call patient and get her set up to see me. I have not seen her since October 2021. Jan/Feb ok.

## 2022-11-28 NOTE — PROGRESS NOTES
Medicare Annual Wellness Visit    John Lutz is here for Medicare AWV    Assessment & Plan   Initial Medicare annual wellness visit    Recommendations for Preventive Services Due: see orders and patient instructions/AVS.  Recommended screening schedule for the next 5-10 years is provided to the patient in written form: see Patient Instructions/AVS.     No follow-ups on file. Subjective   The following acute and/or chronic problems were also addressed today:  Patient is due to complete some healthcare gaps vaccines and screenings (Bone and Colorectal). Patient has history of cancer and is going to be seeing Oncologist this week, patient encouraged to ask specialist about vaccines and screening to see if there is any she should avoid. Once patient has all information she is encouraged to reach back out to PCP office to get orders placed if Oncologist does not place. Patient's complete Health Risk Assessment and screening values have been reviewed and are found in Flowsheets. The following problems were reviewed today and where indicated follow up appointments were made and/or referrals ordered.     Positive Risk Factor Screenings with Interventions:             General Health and ACP:  General  In general, how would you say your health is?: Good  In the past 7 days, have you experienced any of the following: New or Increased Pain, New or Increased Fatigue, Loneliness, Social Isolation, Stress or Anger?: No  Do you get the social and emotional support that you need?: Yes  Do you have a Living Will?: Yes    Advance Directives       Power of  Living Will ACP-Advance Directive ACP-Power of     Not on File Not on File Not on File Not on File          General Health Risk Interventions:  No Living Will: ACP documents already completed- patient asked to provide copy to the office    Health Habits/Nutrition:  Physical Activity: Inactive    Days of Exercise per Week: 0 days    Minutes of Exercise per Session: 0 min     Have you lost any weight without trying in the past 3 months?: No     Have you seen the dentist within the past year?: Yes  Health Habits/Nutrition Interventions:  Inadequate physical activity:  educational materials provided to promote increased physical activity, patient agrees to increase activity. Hearing/Vision:  Do you or your family notice any trouble with your hearing that hasn't been managed with hearing aids?: (!) Yes (not sure if she is having issues or people mumbling)  Do you have difficulty driving, watching TV, or doing any of your daily activities because of your eyesight?: No  Have you had an eye exam within the past year?: Appointment is scheduled  No results found. Hearing/Vision Interventions:  Hearing concerns:  audiology referral provided            Objective      Patient-Reported Vitals  Patient-Reported Weight: 205lb  Patient-Reported Height: 5'8\"            Allergies   Allergen Reactions    Amoxicillin     Lisinopril Other (See Comments)     States it caused depression     Molds & Smuts     Penicillins      Prior to Visit Medications    Medication Sig Taking?  Authorizing Provider   dilTIAZem (CARDIZEM CD) 180 MG extended release capsule TAKE 1 CAPSULE BY MOUTH TWO TIMES A DAY Yes Theone Score, APRN - CNP   ELIQUIS 5 MG TABS tablet TAKE 1 TABLET BY MOUTH TWO TIMES A DAY  Yes Theone Score, APRN - CNP   Ascorbic Acid (VITAMIN C) 500 MG tablet Take 500 mg by mouth daily Yes Historical Provider, MD MCMAHON 5 MG TABS tablet TAKE 1 TABLET BY MOUTH TWO TIMES A DAY   Theone Score, APRN - CNP       CareTeam (Including outside providers/suppliers regularly involved in providing care):   Patient Care Team:  Aníbal Parnell DO as PCP - General (Internal Medicine)  Aníbal Parnell DO as PCP - REHABILITATION [facility] Empaneled Provider     Reviewed and updated this visit:  Tobacco  Allergies  Meds  Med Hx  Surg Hx  Soc Hx  Fam Hx          Kenya Mensah, was evaluated through a synchronous (real-time) audio encounter. The patient (or guardian if applicable) is aware that this is a billable service, which includes applicable co-pays. This Virtual Visit was conducted with patient's (and/or legal guardian's) consent. The visit was conducted pursuant to the emergency declaration under the [facility], [address] [place] [address] and the [employer] General Act. Patient identification was verified, and a caregiver was present when appropriate. The patient was located at Home: [address] [place] [address]. Provider was located at [facility] (Appt Dept): [address] [place] [address],  [facility]. Susan Estrada RN, 11/28/2022, performed the documented evaluation under the direct supervision of the attending physician. This encounter was performed under Benjie frazier DOs, direct supervision, 11/28/2022.

## 2022-11-28 NOTE — PATIENT INSTRUCTIONS
Personalized Preventive Plan for Vasiliy Raman - 11/28/2022  Medicare offers a range of preventive health benefits. Some of the tests and screenings are paid in full while other may be subject to a deductible, co-insurance, and/or copay. Some of these benefits include a comprehensive review of your medical history including lifestyle, illnesses that may run in your family, and various assessments and screenings as appropriate. After reviewing your medical record and screening and assessments performed today your provider may have ordered immunizations, labs, imaging, and/or referrals for you. A list of these orders (if applicable) as well as your Preventive Care list are included within your After Visit Summary for your review. Other Preventive Recommendations:    A preventive eye exam performed by an eye specialist is recommended every 1-2 years to screen for glaucoma; cataracts, macular degeneration, and other eye disorders. A preventive dental visit is recommended every 6 months. Try to get at least 150 minutes of exercise per week or 10,000 steps per day on a pedometer . Order or download the FREE \"Exercise & Physical Activity: Your Everyday Guide\" from [employer]. Call 9-766.193.8728 or search [employer] online. You need 7261-2385 mg of calcium and 2547-4705 IU of vitamin D per day. It is possible to meet your calcium requirement with diet alone, but a vitamin D supplement is usually necessary to meet this goal.  When exposed to the sun, use a sunscreen that protects against both UVA and UVB radiation with an SPF of 30 or greater. Reapply every 2 to 3 hours or after sweating, drying off with a towel, or swimming. Always wear a seat belt when traveling in a car. Always wear a helmet when riding a bicycle or motorcycle.

## 2022-12-05 ENCOUNTER — NURSE ONLY (OUTPATIENT)
Dept: CARDIOLOGY CLINIC | Age: 72
End: 2022-12-05
Payer: MEDICARE

## 2022-12-05 DIAGNOSIS — I48.0 PAROXYSMAL A-FIB (HCC): Primary | ICD-10-CM

## 2022-12-05 DIAGNOSIS — Z45.09 ENCOUNTER FOR ELECTRONIC ANALYSIS OF REVEAL EVENT RECORDER: ICD-10-CM

## 2022-12-05 PROCEDURE — 93298 REM INTERROG DEV EVAL SCRMS: CPT | Performed by: INTERNAL MEDICINE

## 2022-12-05 PROCEDURE — G2066 INTER DEVC REMOTE 30D: HCPCS | Performed by: INTERNAL MEDICINE

## 2022-12-05 NOTE — PROGRESS NOTES
We received a remote transmission from patient's monitor at home. Remote Linq report shows no arrhythmias. EP physician to review. We will continue to monitor remotely. Implanted for AF management. Pt is on Eliquis. End of 31-day monitoring period 12-5-22.

## 2022-12-13 NOTE — TELEPHONE ENCOUNTER
Medication Refill    Medication needing refilled: ELIQUIS 5 MG      Dosage of the medication:    How are you taking this medication (QD, BID, TID, QID, PRN): 1 TABLET BY MOUTH TWO TIMES A DAY     30 or 90 day supply called in: 90 day supply    When will you run out of your medication:    Which Pharmacy are we sending the medication to?:    [facility], [address] [place] [address] [phone]   [place] [address]   Phone:  [phone]  Fax:  [phone]

## 2023-01-04 RX ORDER — DILTIAZEM HYDROCHLORIDE 180 MG/1
CAPSULE, COATED, EXTENDED RELEASE ORAL
Qty: 180 CAPSULE | Refills: 0 | Status: SHIPPED | OUTPATIENT
Start: 2023-01-04

## 2023-01-04 NOTE — TELEPHONE ENCOUNTER
Received refill request for dilTIAZem (CARDIZEM CD) 180 MG  from [facility]. Last OV: 2022 MXA    Next OV: None.     Last EK2022     Last Filled:  10-5-2022 NPSR

## 2023-01-10 ENCOUNTER — NURSE ONLY (OUTPATIENT)
Dept: CARDIOLOGY CLINIC | Age: 73
End: 2023-01-10
Payer: MEDICARE

## 2023-01-10 DIAGNOSIS — Z45.09 ENCOUNTER FOR ELECTRONIC ANALYSIS OF REVEAL EVENT RECORDER: ICD-10-CM

## 2023-01-10 DIAGNOSIS — I48.0 PAROXYSMAL A-FIB (HCC): Primary | ICD-10-CM

## 2023-01-10 PROCEDURE — G2066 INTER DEVC REMOTE 30D: HCPCS | Performed by: INTERNAL MEDICINE

## 2023-01-10 PROCEDURE — 93298 REM INTERROG DEV EVAL SCRMS: CPT | Performed by: INTERNAL MEDICINE

## 2023-01-10 NOTE — PROGRESS NOTES
We received a remote transmission from patient's monitor at home. Remote Linq report shows no arrhythmias. EP physician to review. We will continue to monitor remotely. Implanted for AF management. Pt is on Eliquis. End of 31-day monitoring period 1-10-23.

## 2023-02-14 ENCOUNTER — NURSE ONLY (OUTPATIENT)
Dept: CARDIOLOGY CLINIC | Age: 73
End: 2023-02-14

## 2023-02-14 DIAGNOSIS — I48.0 PAROXYSMAL A-FIB (HCC): Primary | ICD-10-CM

## 2023-02-14 DIAGNOSIS — Z45.09 ENCOUNTER FOR ELECTRONIC ANALYSIS OF REVEAL EVENT RECORDER: ICD-10-CM

## 2023-02-14 NOTE — PROGRESS NOTES
We received a remote transmission from patient's monitor at home. Remote Linq report shows no arrhythmias. Pt has reached the ASHLEY. Office staff notified. EP physician to review. We will continue to monitor remotely. Implanted for AF management. Pt is on Eliquis. End of 31-day monitoring period 2-14-23.

## 2023-04-03 RX ORDER — DILTIAZEM HYDROCHLORIDE 180 MG/1
CAPSULE, COATED, EXTENDED RELEASE ORAL
Qty: 180 CAPSULE | Refills: 3 | Status: SHIPPED | OUTPATIENT
Start: 2023-04-03

## 2023-07-03 NOTE — TELEPHONE ENCOUNTER
Last OV : 7/13/22 mxa    Last Labs : 10/6/21 cmp    Last Refill : 12/13/22 180w1      Next OV : none

## 2023-09-23 ENCOUNTER — HOSPITAL ENCOUNTER (INPATIENT)
Age: 73
LOS: 1 days | Discharge: HOME | DRG: 694 | End: 2023-09-25
Attending: STUDENT IN AN ORGANIZED HEALTH CARE EDUCATION/TRAINING PROGRAM | Admitting: STUDENT IN AN ORGANIZED HEALTH CARE EDUCATION/TRAINING PROGRAM
Payer: MEDICARE

## 2023-09-23 ENCOUNTER — APPOINTMENT (OUTPATIENT)
Dept: CT IMAGING | Age: 73
DRG: 694 | End: 2023-09-23
Payer: MEDICARE

## 2023-09-23 ENCOUNTER — APPOINTMENT (OUTPATIENT)
Dept: GENERAL RADIOLOGY | Age: 73
DRG: 694 | End: 2023-09-23
Payer: MEDICARE

## 2023-09-23 DIAGNOSIS — D72.829 LEUKOCYTOSIS, UNSPECIFIED TYPE: ICD-10-CM

## 2023-09-23 DIAGNOSIS — R10.11 ABDOMINAL PAIN, RIGHT UPPER QUADRANT: ICD-10-CM

## 2023-09-23 DIAGNOSIS — R11.2 NAUSEA AND VOMITING, UNSPECIFIED VOMITING TYPE: ICD-10-CM

## 2023-09-23 DIAGNOSIS — N17.9 AKI (ACUTE KIDNEY INJURY) (HCC): Primary | ICD-10-CM

## 2023-09-23 DIAGNOSIS — R10.9 RIGHT FLANK PAIN: ICD-10-CM

## 2023-09-23 LAB
ALBUMIN SERPL-MCNC: 4.6 G/DL (ref 3.4–5)
ALBUMIN/GLOB SERPL: 1.8 {RATIO} (ref 1.1–2.2)
ALP SERPL-CCNC: 99 U/L (ref 40–129)
ALT SERPL-CCNC: 18 U/L (ref 10–40)
ANION GAP SERPL CALCULATED.3IONS-SCNC: 13 MMOL/L (ref 3–16)
AST SERPL-CCNC: 31 U/L (ref 15–37)
BACTERIA URNS QL MICRO: ABNORMAL /HPF
BASOPHILS # BLD: 0.1 K/UL (ref 0–0.2)
BASOPHILS NFR BLD: 0.3 %
BILIRUB SERPL-MCNC: 0.3 MG/DL (ref 0–1)
BILIRUB UR QL STRIP.AUTO: NEGATIVE
BUN SERPL-MCNC: 32 MG/DL (ref 7–20)
CALCIUM SERPL-MCNC: 9.7 MG/DL (ref 8.3–10.6)
CHLORIDE SERPL-SCNC: 105 MMOL/L (ref 99–110)
CLARITY UR: CLEAR
CO2 SERPL-SCNC: 20 MMOL/L (ref 21–32)
COLOR UR: YELLOW
CREAT SERPL-MCNC: 1.5 MG/DL (ref 0.6–1.2)
DEPRECATED RDW RBC AUTO: 15.8 % (ref 12.4–15.4)
EOSINOPHIL # BLD: 0 K/UL (ref 0–0.6)
EOSINOPHIL NFR BLD: 0 %
EPI CELLS #/AREA URNS AUTO: 2 /HPF (ref 0–5)
GFR SERPLBLD CREATININE-BSD FMLA CKD-EPI: 36 ML/MIN/{1.73_M2}
GLUCOSE SERPL-MCNC: 161 MG/DL (ref 70–99)
GLUCOSE UR STRIP.AUTO-MCNC: NEGATIVE MG/DL
HCT VFR BLD AUTO: 48.6 % (ref 36–48)
HGB BLD-MCNC: 16 G/DL (ref 12–16)
HGB UR QL STRIP.AUTO: ABNORMAL
HYALINE CASTS #/AREA URNS AUTO: 0 /LPF (ref 0–8)
KETONES UR STRIP.AUTO-MCNC: 15 MG/DL
LEUKOCYTE ESTERASE UR QL STRIP.AUTO: NEGATIVE
LIPASE SERPL-CCNC: 31 U/L (ref 13–60)
LYMPHOCYTES # BLD: 1 K/UL (ref 1–5.1)
LYMPHOCYTES NFR BLD: 5.9 %
MCH RBC QN AUTO: 28.2 PG (ref 26–34)
MCHC RBC AUTO-ENTMCNC: 33 G/DL (ref 31–36)
MCV RBC AUTO: 85.4 FL (ref 80–100)
MONOCYTES # BLD: 0.3 K/UL (ref 0–1.3)
MONOCYTES NFR BLD: 1.8 %
NEUTROPHILS # BLD: 16 K/UL (ref 1.7–7.7)
NEUTROPHILS NFR BLD: 92 %
NITRITE UR QL STRIP.AUTO: NEGATIVE
PH UR STRIP.AUTO: 5.5 [PH] (ref 5–8)
PLATELET # BLD AUTO: 240 K/UL (ref 135–450)
PMV BLD AUTO: 9 FL (ref 5–10.5)
POTASSIUM SERPL-SCNC: 5.1 MMOL/L (ref 3.5–5.1)
PROT SERPL-MCNC: 7.1 G/DL (ref 6.4–8.2)
PROT UR STRIP.AUTO-MCNC: 30 MG/DL
RBC # BLD AUTO: 5.69 M/UL (ref 4–5.2)
RBC CLUMPS #/AREA URNS AUTO: 6 /HPF (ref 0–4)
SODIUM SERPL-SCNC: 138 MMOL/L (ref 136–145)
SP GR UR STRIP.AUTO: 1.01 (ref 1–1.03)
UA COMPLETE W REFLEX CULTURE PNL UR: ABNORMAL
UA DIPSTICK W REFLEX MICRO PNL UR: YES
URN SPEC COLLECT METH UR: ABNORMAL
UROBILINOGEN UR STRIP-ACNC: 0.2 E.U./DL
WBC # BLD AUTO: 17.4 K/UL (ref 4–11)
WBC #/AREA URNS AUTO: 1 /HPF (ref 0–5)

## 2023-09-23 PROCEDURE — 83690 ASSAY OF LIPASE: CPT

## 2023-09-23 PROCEDURE — 6360000004 HC RX CONTRAST MEDICATION: Performed by: PHYSICIAN ASSISTANT

## 2023-09-23 PROCEDURE — 99285 EMERGENCY DEPT VISIT HI MDM: CPT

## 2023-09-23 PROCEDURE — 80053 COMPREHEN METABOLIC PANEL: CPT

## 2023-09-23 PROCEDURE — 74177 CT ABD & PELVIS W/CONTRAST: CPT

## 2023-09-23 PROCEDURE — 81001 URINALYSIS AUTO W/SCOPE: CPT

## 2023-09-23 PROCEDURE — 2580000003 HC RX 258: Performed by: PHYSICIAN ASSISTANT

## 2023-09-23 PROCEDURE — 93005 ELECTROCARDIOGRAM TRACING: CPT | Performed by: STUDENT IN AN ORGANIZED HEALTH CARE EDUCATION/TRAINING PROGRAM

## 2023-09-23 PROCEDURE — 96376 TX/PRO/DX INJ SAME DRUG ADON: CPT

## 2023-09-23 PROCEDURE — 71045 X-RAY EXAM CHEST 1 VIEW: CPT

## 2023-09-23 PROCEDURE — 85025 COMPLETE CBC W/AUTO DIFF WBC: CPT

## 2023-09-23 PROCEDURE — 6360000002 HC RX W HCPCS: Performed by: PHYSICIAN ASSISTANT

## 2023-09-23 PROCEDURE — 96374 THER/PROPH/DIAG INJ IV PUSH: CPT

## 2023-09-23 PROCEDURE — 96375 TX/PRO/DX INJ NEW DRUG ADDON: CPT

## 2023-09-23 PROCEDURE — 96361 HYDRATE IV INFUSION ADD-ON: CPT

## 2023-09-23 RX ORDER — 0.9 % SODIUM CHLORIDE 0.9 %
1000 INTRAVENOUS SOLUTION INTRAVENOUS ONCE
Status: COMPLETED | OUTPATIENT
Start: 2023-09-23 | End: 2023-09-23

## 2023-09-23 RX ORDER — ONDANSETRON 2 MG/ML
4 INJECTION INTRAMUSCULAR; INTRAVENOUS EVERY 6 HOURS PRN
Status: DISCONTINUED | OUTPATIENT
Start: 2023-09-23 | End: 2023-09-24 | Stop reason: HOSPADM

## 2023-09-23 RX ORDER — METOCLOPRAMIDE HYDROCHLORIDE 5 MG/ML
10 INJECTION INTRAMUSCULAR; INTRAVENOUS ONCE
Status: COMPLETED | OUTPATIENT
Start: 2023-09-23 | End: 2023-09-23

## 2023-09-23 RX ORDER — LABETALOL HYDROCHLORIDE 5 MG/ML
10 INJECTION, SOLUTION INTRAVENOUS ONCE
Status: DISCONTINUED | OUTPATIENT
Start: 2023-09-23 | End: 2023-09-24 | Stop reason: HOSPADM

## 2023-09-23 RX ORDER — ONDANSETRON 2 MG/ML
4 INJECTION INTRAMUSCULAR; INTRAVENOUS
Status: DISCONTINUED | OUTPATIENT
Start: 2023-09-23 | End: 2023-09-24 | Stop reason: HOSPADM

## 2023-09-23 RX ORDER — MORPHINE SULFATE 2 MG/ML
2 INJECTION, SOLUTION INTRAMUSCULAR; INTRAVENOUS
Status: COMPLETED | OUTPATIENT
Start: 2023-09-23 | End: 2023-09-23

## 2023-09-23 RX ADMIN — SODIUM CHLORIDE 1000 ML: 9 INJECTION, SOLUTION INTRAVENOUS at 22:11

## 2023-09-23 RX ADMIN — MORPHINE SULFATE 2 MG: 2 INJECTION, SOLUTION INTRAMUSCULAR; INTRAVENOUS at 22:11

## 2023-09-23 RX ADMIN — ONDANSETRON 4 MG: 2 INJECTION INTRAMUSCULAR; INTRAVENOUS at 21:54

## 2023-09-23 RX ADMIN — METOCLOPRAMIDE 10 MG: 5 INJECTION, SOLUTION INTRAMUSCULAR; INTRAVENOUS at 22:52

## 2023-09-23 RX ADMIN — ONDANSETRON 4 MG: 2 INJECTION INTRAMUSCULAR; INTRAVENOUS at 19:07

## 2023-09-23 RX ADMIN — IOPAMIDOL 75 ML: 755 INJECTION, SOLUTION INTRAVENOUS at 22:50

## 2023-09-23 ASSESSMENT — PAIN DESCRIPTION - ORIENTATION: ORIENTATION: RIGHT;LEFT;UPPER

## 2023-09-23 ASSESSMENT — PAIN SCALES - GENERAL: PAINLEVEL_OUTOF10: 6

## 2023-09-23 ASSESSMENT — ENCOUNTER SYMPTOMS
CONSTIPATION: 0
COUGH: 0
DIARRHEA: 0
SHORTNESS OF BREATH: 0
EYE PAIN: 0
SORE THROAT: 0
NAUSEA: 1
BACK PAIN: 0
VOMITING: 1
RHINORRHEA: 0
ABDOMINAL PAIN: 1

## 2023-09-23 ASSESSMENT — PAIN DESCRIPTION - DESCRIPTORS: DESCRIPTORS: BURNING

## 2023-09-23 ASSESSMENT — PAIN DESCRIPTION - LOCATION: LOCATION: ABDOMEN

## 2023-09-23 NOTE — ED PROVIDER NOTES
[facility]        Pt Name: Raghu Lee  MRN: [id number]  [address] [facility] [place] 1950  Date of evaluation: 9/23/2023  Provider: SELIN Henry  PCP: Kathie Gibson DO  Note Started: 7:42 PM EDT 9/23/23      WENDY. I have evaluated this patient. CHIEF COMPLAINT       Chief Complaint   Patient presents with    Abdominal Pain     Pt brought to the hospital by  EMS from home due to having abdominal pain, hx of leukemia since 2004, 4 mg of zofran given, hypertensive, pain located in RUQ, vomit and stool. HISTORY OF PRESENT ILLNESS: 1 or more Elements     History from : Patient    Limitations to history : None    Raghu Lee is a 74 y.o. female who presents to the emergency room due to right flank pain, hematuria earlier today. Patient states that her pain moved from her right flank into her abdomen over the course of the day and states that she has been feeling very nauseous because of this. She has had 1 episode of vomiting. Patient was given Zofran prior to arrival via EMS. She does have a history of leukemia diagnosed in 2004. Denies any diarrhea or constipation. Nursing Notes were all reviewed and agreed with or any disagreements were addressed in the HPI. REVIEW OF SYSTEMS :      Review of Systems   Constitutional:  Positive for fatigue. Negative for chills, diaphoresis and fever. HENT:  Negative for congestion, rhinorrhea and sore throat. Eyes:  Negative for pain and visual disturbance. Respiratory:  Negative for cough and shortness of breath. Cardiovascular:  Negative for chest pain and leg swelling. Gastrointestinal:  Positive for abdominal pain, nausea and vomiting. Negative for constipation and diarrhea. Genitourinary:  Positive for hematuria. Negative for difficulty urinating, dysuria and frequency. Musculoskeletal:  Negative for back pain and neck pain. Skin:  Negative for rash and wound.

## 2023-09-24 PROBLEM — R11.2 NAUSEA & VOMITING: Status: ACTIVE | Noted: 2023-09-24

## 2023-09-24 LAB
AMPHETAMINES UR QL SCN>1000 NG/ML: ABNORMAL
ANION GAP SERPL CALCULATED.3IONS-SCNC: 12 MMOL/L (ref 3–16)
BARBITURATES UR QL SCN>200 NG/ML: ABNORMAL
BASOPHILS # BLD: 0.1 K/UL (ref 0–0.2)
BASOPHILS NFR BLD: 0.4 %
BENZODIAZ UR QL SCN>200 NG/ML: ABNORMAL
BUN SERPL-MCNC: 31 MG/DL (ref 7–20)
CALCIUM SERPL-MCNC: 8.9 MG/DL (ref 8.3–10.6)
CANNABINOIDS UR QL SCN>50 NG/ML: ABNORMAL
CHLORIDE SERPL-SCNC: 107 MMOL/L (ref 99–110)
CO2 SERPL-SCNC: 23 MMOL/L (ref 21–32)
COCAINE UR QL SCN: ABNORMAL
CREAT SERPL-MCNC: 1.6 MG/DL (ref 0.6–1.2)
DEPRECATED RDW RBC AUTO: 15 % (ref 12.4–15.4)
DRUG SCREEN COMMENT UR-IMP: ABNORMAL
EKG ATRIAL RATE: 95 BPM
EKG DIAGNOSIS: NORMAL
EKG P AXIS: 34 DEGREES
EKG P-R INTERVAL: 180 MS
EKG Q-T INTERVAL: 410 MS
EKG QRS DURATION: 90 MS
EKG QTC CALCULATION (BAZETT): 515 MS
EKG R AXIS: -3 DEGREES
EKG T AXIS: 39 DEGREES
EKG VENTRICULAR RATE: 95 BPM
EOSINOPHIL # BLD: 0 K/UL (ref 0–0.6)
EOSINOPHIL NFR BLD: 0 %
FENTANYL SCREEN, URINE: ABNORMAL
GFR SERPLBLD CREATININE-BSD FMLA CKD-EPI: 33 ML/MIN/{1.73_M2}
GLUCOSE SERPL-MCNC: 132 MG/DL (ref 70–99)
HCT VFR BLD AUTO: 41.7 % (ref 36–48)
HGB BLD-MCNC: 13.8 G/DL (ref 12–16)
LYMPHOCYTES # BLD: 0.8 K/UL (ref 1–5.1)
LYMPHOCYTES NFR BLD: 5.4 %
MCH RBC QN AUTO: 28.3 PG (ref 26–34)
MCHC RBC AUTO-ENTMCNC: 33 G/DL (ref 31–36)
MCV RBC AUTO: 85.6 FL (ref 80–100)
METHADONE UR QL SCN>300 NG/ML: ABNORMAL
MONOCYTES # BLD: 0.8 K/UL (ref 0–1.3)
MONOCYTES NFR BLD: 5.3 %
NEUTROPHILS # BLD: 13.1 K/UL (ref 1.7–7.7)
NEUTROPHILS NFR BLD: 88.9 %
OPIATES UR QL SCN>300 NG/ML: POSITIVE
OXYCODONE UR QL SCN: ABNORMAL
PCP UR QL SCN>25 NG/ML: ABNORMAL
PH UR STRIP: 6 [PH]
PLATELET # BLD AUTO: 212 K/UL (ref 135–450)
PMV BLD AUTO: 9.3 FL (ref 5–10.5)
POTASSIUM SERPL-SCNC: 4.2 MMOL/L (ref 3.5–5.1)
RBC # BLD AUTO: 4.87 M/UL (ref 4–5.2)
SODIUM SERPL-SCNC: 142 MMOL/L (ref 136–145)
WBC # BLD AUTO: 14.7 K/UL (ref 4–11)

## 2023-09-24 PROCEDURE — 6360000002 HC RX W HCPCS: Performed by: STUDENT IN AN ORGANIZED HEALTH CARE EDUCATION/TRAINING PROGRAM

## 2023-09-24 PROCEDURE — 6370000000 HC RX 637 (ALT 250 FOR IP): Performed by: STUDENT IN AN ORGANIZED HEALTH CARE EDUCATION/TRAINING PROGRAM

## 2023-09-24 PROCEDURE — 1200000000 HC SEMI PRIVATE

## 2023-09-24 PROCEDURE — 80048 BASIC METABOLIC PNL TOTAL CA: CPT

## 2023-09-24 PROCEDURE — 51798 US URINE CAPACITY MEASURE: CPT

## 2023-09-24 PROCEDURE — 85025 COMPLETE CBC W/AUTO DIFF WBC: CPT

## 2023-09-24 PROCEDURE — 93010 ELECTROCARDIOGRAM REPORT: CPT | Performed by: INTERNAL MEDICINE

## 2023-09-24 PROCEDURE — 2580000003 HC RX 258: Performed by: STUDENT IN AN ORGANIZED HEALTH CARE EDUCATION/TRAINING PROGRAM

## 2023-09-24 PROCEDURE — 36415 COLL VENOUS BLD VENIPUNCTURE: CPT

## 2023-09-24 PROCEDURE — 80307 DRUG TEST PRSMV CHEM ANLYZR: CPT

## 2023-09-24 RX ORDER — DILTIAZEM HYDROCHLORIDE 180 MG/1
180 CAPSULE, COATED, EXTENDED RELEASE ORAL 2 TIMES DAILY
Status: DISCONTINUED | OUTPATIENT
Start: 2023-09-24 | End: 2023-09-25 | Stop reason: HOSPADM

## 2023-09-24 RX ORDER — SODIUM CHLORIDE 9 MG/ML
INJECTION, SOLUTION INTRAVENOUS CONTINUOUS
Status: ACTIVE | OUTPATIENT
Start: 2023-09-24 | End: 2023-09-24

## 2023-09-24 RX ORDER — ONDANSETRON 4 MG/1
4 TABLET, ORALLY DISINTEGRATING ORAL EVERY 8 HOURS PRN
Status: DISCONTINUED | OUTPATIENT
Start: 2023-09-24 | End: 2023-09-25 | Stop reason: HOSPADM

## 2023-09-24 RX ORDER — SODIUM CHLORIDE 0.9 % (FLUSH) 0.9 %
5-40 SYRINGE (ML) INJECTION PRN
Status: DISCONTINUED | OUTPATIENT
Start: 2023-09-24 | End: 2023-09-25 | Stop reason: HOSPADM

## 2023-09-24 RX ORDER — SODIUM CHLORIDE 0.9 % (FLUSH) 0.9 %
5-40 SYRINGE (ML) INJECTION EVERY 12 HOURS SCHEDULED
Status: DISCONTINUED | OUTPATIENT
Start: 2023-09-24 | End: 2023-09-25 | Stop reason: HOSPADM

## 2023-09-24 RX ORDER — ONDANSETRON 2 MG/ML
4 INJECTION INTRAMUSCULAR; INTRAVENOUS EVERY 6 HOURS PRN
Status: DISCONTINUED | OUTPATIENT
Start: 2023-09-24 | End: 2023-09-25 | Stop reason: HOSPADM

## 2023-09-24 RX ORDER — ACETAMINOPHEN 325 MG/1
650 TABLET ORAL EVERY 6 HOURS PRN
Status: DISCONTINUED | OUTPATIENT
Start: 2023-09-24 | End: 2023-09-25 | Stop reason: HOSPADM

## 2023-09-24 RX ORDER — LABETALOL HYDROCHLORIDE 5 MG/ML
10 INJECTION, SOLUTION INTRAVENOUS EVERY 6 HOURS PRN
Status: DISCONTINUED | OUTPATIENT
Start: 2023-09-24 | End: 2023-09-25 | Stop reason: HOSPADM

## 2023-09-24 RX ORDER — POLYETHYLENE GLYCOL 3350 17 G/17G
17 POWDER, FOR SOLUTION ORAL DAILY PRN
Status: DISCONTINUED | OUTPATIENT
Start: 2023-09-24 | End: 2023-09-25 | Stop reason: HOSPADM

## 2023-09-24 RX ORDER — ACETAMINOPHEN 650 MG/1
650 SUPPOSITORY RECTAL EVERY 6 HOURS PRN
Status: DISCONTINUED | OUTPATIENT
Start: 2023-09-24 | End: 2023-09-25 | Stop reason: HOSPADM

## 2023-09-24 RX ORDER — SODIUM CHLORIDE 9 MG/ML
INJECTION, SOLUTION INTRAVENOUS PRN
Status: DISCONTINUED | OUTPATIENT
Start: 2023-09-24 | End: 2023-09-25 | Stop reason: HOSPADM

## 2023-09-24 RX ADMIN — SODIUM CHLORIDE, PRESERVATIVE FREE 10 ML: 5 INJECTION INTRAVENOUS at 20:57

## 2023-09-24 RX ADMIN — DILTIAZEM HYDROCHLORIDE 180 MG: 180 CAPSULE, EXTENDED RELEASE ORAL at 20:55

## 2023-09-24 RX ADMIN — SODIUM CHLORIDE: 9 INJECTION, SOLUTION INTRAVENOUS at 03:38

## 2023-09-24 RX ADMIN — APIXABAN 5 MG: 5 TABLET, FILM COATED ORAL at 20:55

## 2023-09-24 RX ADMIN — APIXABAN 5 MG: 5 TABLET, FILM COATED ORAL at 03:39

## 2023-09-24 RX ADMIN — ONDANSETRON 4 MG: 2 INJECTION INTRAMUSCULAR; INTRAVENOUS at 03:39

## 2023-09-24 ASSESSMENT — PAIN SCALES - GENERAL
PAINLEVEL_OUTOF10: 0
PAINLEVEL_OUTOF10: 5

## 2023-09-24 ASSESSMENT — PAIN DESCRIPTION - DESCRIPTORS: DESCRIPTORS: BURNING

## 2023-09-24 ASSESSMENT — PAIN DESCRIPTION - LOCATION: LOCATION: ABDOMEN

## 2023-09-24 ASSESSMENT — PAIN DESCRIPTION - ORIENTATION: ORIENTATION: RIGHT;LEFT;UPPER

## 2023-09-24 NOTE — H&P
opacities which is more prominent on the right is more apparent. There is questionable minimal blunting of the right costophrenic sulcus. Mild cardiomegaly and mild central pulmonary congestion or pulmonary artery hypertension which is more prominent. Hazy bibasilar opacities which is more prominent on the right and a questionable small right pleural effusion. Recommend follow-up       This note was likely completed using voice recognition technology and may contain unintended errors.      Electronically signed by Patricia Ureña MD on 9/24/2023 at 1:51 AM

## 2023-09-24 NOTE — CARE COORDINATION
Discharge Planning:     (CM) reviewed the patient's chart to assess needs. Patient's Readmission Risk Score is Calculating. Patient's medical insurance is [facility] Dual complete. Patient's PCP is LYNNE CLEMONS . No needs anticipated, at this time. CM team to follow. Staff to inform CM if additional discharge needs arise.      Electronically signed by Larissa Urbina on 9/24/2023 at 8:45 AM

## 2023-09-24 NOTE — ED PROVIDER NOTES
I did not perform history or physical on Rolley Dikes. This patient was seen independently by an WENDY. I did review the EKG, which is documented below. EKG  The Ekg interpreted by me in the absence of a cardiologist shows. normal sinus rhythm with a rate of 95  Axis is   Left axis deviation  QTc is  515  Intervals and Durations are unremarkable. No specific ST-T wave changes appreciated. No evidence of acute ischemia.    QT prolongation, no other significant change from prior EKG dated 1/12/22          Dorcas Kussmaul, MD  09/24/23 0479

## 2023-09-24 NOTE — ED NOTES
ED TO INPATIENT SBAR HANDOFF    Patient Name: Saurav Pratt   :  1950  74 y.o. MRN:  [id number]  Preferred Name    ED Room #:  ED-0023/  Family/Caregiver Present no   Restraints no   Sitter no   Sepsis Risk Score Sepsis Risk Score: 6.34    Situation  Code Status: Prior No additional code details. Allergies: Amoxicillin, Lisinopril, Molds & smuts, and Penicillins  Weight: Patient Vitals for the past 96 hrs (Last 3 readings):   Weight   23 1845 195 lb (88.5 kg)     Arrived from: home  Chief Complaint:   Chief Complaint   Patient presents with    Abdominal Pain     Pt brought to the hospital by [facility] from home due to having abdominal pain, hx of leukemia since , 4 mg of zofran given, hypertensive, pain located in RUQ, vomit and stool. Hospital Problem/Diagnosis:  Principal Problem:    Nausea & vomiting  Resolved Problems:    * No resolved hospital problems. *    Imaging:   CT ABDOMEN PELVIS W IV CONTRAST Additional Contrast? None   Final Result   1. Right kidney is edematous with perinephric edema and non loculated fluid. There is right moderate hydronephrosis and hydroureter but without a ureteral   calculus. Fluid-filled right ureter courses down to the urinary bladder. The bladder wall is thickened and likely some trabeculation. Correlate   clinically for recent passage of a right renal calculus. However ascending   urinary tract infection and pyelonephritis are not excluded. 2.  Multiple cysts are present in both kidneys but with the larger cysts   having a simple fluid attenuation. No dedicated follow-up is indicated for   the cysts. 3.  Colonic diverticulosis but without signs of acute diverticulitis. Jimmie Closs 4.  A 5-6 mm area of nodular scarring in the margin of the middle lobe would   not require further workup in a low risk patient.       Fleischner Society guidelines for follow-up and management of incidentally   detected pulmonary nodules:      Single Solid

## 2023-09-25 VITALS
DIASTOLIC BLOOD PRESSURE: 93 MMHG | SYSTOLIC BLOOD PRESSURE: 150 MMHG | TEMPERATURE: 98 F | WEIGHT: 205.2 LBS | BODY MASS INDEX: 32.21 KG/M2 | HEIGHT: 67 IN | OXYGEN SATURATION: 95 % | RESPIRATION RATE: 18 BRPM | HEART RATE: 72 BPM

## 2023-09-25 LAB
ANION GAP SERPL CALCULATED.3IONS-SCNC: 11 MMOL/L (ref 3–16)
BASOPHILS # BLD: 0.1 K/UL (ref 0–0.2)
BASOPHILS NFR BLD: 1.2 %
BUN SERPL-MCNC: 21 MG/DL (ref 7–20)
CALCIUM SERPL-MCNC: 8.6 MG/DL (ref 8.3–10.6)
CHLORIDE SERPL-SCNC: 109 MMOL/L (ref 99–110)
CO2 SERPL-SCNC: 22 MMOL/L (ref 21–32)
CREAT SERPL-MCNC: 1 MG/DL (ref 0.6–1.2)
DEPRECATED RDW RBC AUTO: 15.3 % (ref 12.4–15.4)
EOSINOPHIL # BLD: 0.1 K/UL (ref 0–0.6)
EOSINOPHIL NFR BLD: 1.2 %
GFR SERPLBLD CREATININE-BSD FMLA CKD-EPI: 59 ML/MIN/{1.73_M2}
GLUCOSE SERPL-MCNC: 92 MG/DL (ref 70–99)
HCT VFR BLD AUTO: 40.3 % (ref 36–48)
HGB BLD-MCNC: 13.5 G/DL (ref 12–16)
LYMPHOCYTES # BLD: 1.9 K/UL (ref 1–5.1)
LYMPHOCYTES NFR BLD: 20.4 %
MCH RBC QN AUTO: 28.8 PG (ref 26–34)
MCHC RBC AUTO-ENTMCNC: 33.5 G/DL (ref 31–36)
MCV RBC AUTO: 85.9 FL (ref 80–100)
MONOCYTES # BLD: 0.6 K/UL (ref 0–1.3)
MONOCYTES NFR BLD: 6 %
NEUTROPHILS # BLD: 6.7 K/UL (ref 1.7–7.7)
NEUTROPHILS NFR BLD: 71.2 %
PLATELET # BLD AUTO: 180 K/UL (ref 135–450)
PMV BLD AUTO: 8.8 FL (ref 5–10.5)
POTASSIUM SERPL-SCNC: 4.2 MMOL/L (ref 3.5–5.1)
RBC # BLD AUTO: 4.68 M/UL (ref 4–5.2)
SODIUM SERPL-SCNC: 142 MMOL/L (ref 136–145)
WBC # BLD AUTO: 9.3 K/UL (ref 4–11)

## 2023-09-25 PROCEDURE — 80048 BASIC METABOLIC PNL TOTAL CA: CPT

## 2023-09-25 PROCEDURE — 36415 COLL VENOUS BLD VENIPUNCTURE: CPT

## 2023-09-25 PROCEDURE — 6370000000 HC RX 637 (ALT 250 FOR IP): Performed by: STUDENT IN AN ORGANIZED HEALTH CARE EDUCATION/TRAINING PROGRAM

## 2023-09-25 PROCEDURE — 2580000003 HC RX 258: Performed by: STUDENT IN AN ORGANIZED HEALTH CARE EDUCATION/TRAINING PROGRAM

## 2023-09-25 PROCEDURE — 97530 THERAPEUTIC ACTIVITIES: CPT

## 2023-09-25 PROCEDURE — 97165 OT EVAL LOW COMPLEX 30 MIN: CPT

## 2023-09-25 PROCEDURE — 85025 COMPLETE CBC W/AUTO DIFF WBC: CPT

## 2023-09-25 PROCEDURE — 97161 PT EVAL LOW COMPLEX 20 MIN: CPT

## 2023-09-25 RX ADMIN — DILTIAZEM HYDROCHLORIDE 180 MG: 180 CAPSULE, EXTENDED RELEASE ORAL at 08:16

## 2023-09-25 RX ADMIN — APIXABAN 5 MG: 5 TABLET, FILM COATED ORAL at 08:16

## 2023-09-25 RX ADMIN — SODIUM CHLORIDE, PRESERVATIVE FREE 10 ML: 5 INJECTION INTRAVENOUS at 08:17

## 2023-09-25 ASSESSMENT — PAIN SCALES - GENERAL: PAINLEVEL_OUTOF10: 0

## 2023-09-25 NOTE — DISCHARGE SUMMARY
cysts  having a simple fluid attenuation. No dedicated follow-up is indicated for the cysts. 3.  Colonic diverticulosis but without signs of acute diverticulitis. Herminia Plants 4.  A 5-6 mm area of nodular scarring in the margin of the middle lobe would not require further workup in a low risk patient. Fleischner Society guidelines for follow-up and management of incidentally detected pulmonary nodules:     Single Solid Nodule:     Nodule size less than 6 mm  In a low-risk patient, no routine follow-up. In a high-risk patient, optional CT at 12 months. - Low risk patients include individuals with minimal or absent history of smoking and other known risk factors. - High risk patients include individuals with a history or smoking or known risk factors. Invasive procedures and treatments. None     [facility] Course.  74 y.o. female with hx of atrial fib on Eliquis, HTN. Presented with c/o hypertension, right flank and abdominal pain that radiated into the groin and was associated with n/v and gross hematuria. CT abd/pelvis showed right moderate hydroureter without obstructing calculus. Admitted with suspected right sided nephrolithiasis and MARISOL. Suspected right-sided nephrolithiasis: Pain improved, likely has passed a stone. Right moderate hydronephrosis + hydroureter noted on CT. Seen by urology and to follow up as outpatient for cystoscopy and retrograde pyelogram. Concern for bladder or renal cancer given hydronephrosis and gross hematuria. MARISOL: Creatinine 1.5 on presentation, peaked at 1.6. Likely obstructive secondary to nephrolithiasis. Received IV fluids with improvement. Creatinine 1.0 on DC. Hypertensive urgency: /122 on presentation. Received IV labetalol. Likely secondary to pain. Resolved. Continued diltiazem. Leukocytosis: WBC 17.4 on presentation. Likely reactive. No acute findings on CXR, UA appears noninfective. No indication for antibiotics.

## 2023-09-25 NOTE — PLAN OF CARE
Problem: Pain  Goal: Verbalizes/displays adequate comfort level or baseline comfort level  9/25/2023 1230 by Niyah Thomas RN  Outcome: Adequate for Discharge  9/25/2023 0502 by Bertha Lane RN  Outcome: Progressing  Flowsheets (Taken 9/25/2023 0502)  Verbalizes/displays adequate comfort level or baseline comfort level:   Encourage patient to monitor pain and request assistance   Assess pain using appropriate pain scale   Administer analgesics based on type and severity of pain and evaluate response   Implement non-pharmacological measures as appropriate and evaluate response     Problem: Skin/Tissue Integrity  Goal: Absence of new skin breakdown  Description: 1. Monitor for areas of redness and/or skin breakdown  2. Assess vascular access sites hourly  3. Every 4-6 hours minimum:  Change oxygen saturation probe site  4. Every 4-6 hours:  If on nasal continuous positive airway pressure, respiratory therapy assess nares and determine need for appliance change or resting period.   9/25/2023 1230 by Niyah Thomas RN  Outcome: Adequate for Discharge  9/25/2023 0502 by Bertha Lane RN  Outcome: Progressing     Problem: Safety - Adult  Goal: Free from fall injury  9/25/2023 1230 by Niyah Thomas RN  Outcome: Adequate for Discharge  9/25/2023 0502 by Bertha Lane RN  Outcome: Progressing     Problem: ABCDS Injury Assessment  Goal: Absence of physical injury  Outcome: Adequate for Discharge

## 2023-09-25 NOTE — PROGRESS NOTES
4 Eyes Skin Assessment     NAME:  Anthony Harding  YOB: 1950  MEDICAL RECORD NUMBER:  [id number]    The patient is being assessed for  Admission    I agree that at least one RN has performed a thorough Head to Toe Skin Assessment on the patient. ALL assessment sites listed below have been assessed. Areas assessed by both nurses:    Head, Face, Ears, Shoulders, Back, Chest, Arms, Elbows, Hands, Sacrum. Buttock, Coccyx, Ischium, Legs. Feet and Heels, and Under Medical Devices         Does the Patient have a Wound?  No noted wound(s)       Willard Prevention initiated by RN: Yes  Wound Care Orders initiated by RN: No    Pressure Injury (Stage 3,4, Unstageable, DTI, NWPT, and Complex wounds) if present, place Wound referral order by RN under : No    New Ostomies, if present place, Ostomy referral order under : No     Nurse 1 eSignature: Electronically signed by Richmond Banks RN on 9/24/23 at 5:23 AM EDT    **SHARE this note so that the co-signing nurse can place an eSignature**    Nurse 2 eSignature: Electronically signed by Claudette Ocampo RN on 9/24/23 at 7:46 AM EDT
Admission assessment completed. VSS, BP is a little elevated, pt said she has been that way today and have not had anything for her BP. Scheduled meds given/help per MAR orders. Pt is A&Ox4. Pt is connected to a purewick, and had to place a new IV. Pt has some complaints of nausea but no vomiting at this time. No other needs at this time. Brakes locked, bed in lowest position, bed alarm engaged. Educated pt on how the call light works and told her to call if she needed anything. All belongings and call light within reach.
Pt resting comfortably in the chair. A & O x4. All LDA's remain C/D/I. Pt says she feels no pain and is not nauseas today. VSS, Medications given per MAR. Chair locked with alarm in place. Call light, bedside table, and all personal belongings are within reach. Will continue to closely monitor.
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: cooking, shopping, spending time with friends, going to [employer], reading,   Recent Falls: no recent falls  _________________________________________________________________________    Examination   Vision:   Vision Gross Assessment: Impaired - states that she needs to go to the eye doctor to get new glasses  Hearing:   hard of hearing  Observation:   General Observation:  seated indep on EOB  Posture:   Normal  Sensation:   WFL  ROM:   (B) LE ROM WFL  Strength:   (B) LE gross strength WFL  Therapist Clinical Decision Making (Complexity): low complexity  Clinical Presentation: stable      Subjective  General: Pt is seated EOB upon arrival, hoping to discharge home today. Reports that she is feeling better and is very hungry, planning to go eat out at a restaurant today when she is released. Pain: 0/10  Pain Interventions: not applicable       Functional Mobility  Bed Mobility  Bed mobility not completed on this date. Comments:  Transfers  Sit to stand transfer: Independent  Stand to sit transfer: Independent  Comments:  Ambulation  Surface:level surface  Assistive Device: no device  Assistance: Independent  Distance: 150 ft  Gait Mechanics: antalgic gait pattern, R ankle eversion, increased M/L sway, wide KARTIK, decreased step height, normal stanislaw  Comments:  pt reports that this gait pattern is her norm  Stair Mobility  Stair mobility not completed on this date.   Comments:  Balance  Static Sitting Balance: good: independent with functional balance in unsupported position  Dynamic Sitting Balance: good: independent with functional balance in unsupported position  Static Standing Balance: good: independent with functional balance in unsupported position  Dynamic Standing Balance: good: independent with functional balance in unsupported position  Comments:    Other Therapeutic Interventions    Functional Outcomes  AM-PAC
BILITOT 0.3   ALKPHOS 99     Lipids:   Lab Results   Component Value Date/Time    CHOL 257 10/06/2021 11:03 AM    HDL 65 10/06/2021 11:03 AM    TRIG 268 10/06/2021 11:03 AM     Hemoglobin A1C:   Lab Results   Component Value Date/Time    LABA1C 5.4 04/01/2019 11:13 AM     TSH:   Lab Results   Component Value Date/Time    TSH 1.72 05/27/2019 12:13 PM     Troponin: No results found for: \"TROPONINT\"  Lactic Acid: No results for input(s): \"LACTA\" in the last 72 hours. BNP: No results for input(s): \"PROBNP\" in the last 72 hours. UA:  Lab Results   Component Value Date/Time    NITRU Negative 09/23/2023 07:20 PM    COLORU Yellow 09/23/2023 07:20 PM    PHUR 6.0 09/24/2023 06:11 AM    WBCUA 1 09/23/2023 07:20 PM    RBCUA 6 09/23/2023 07:20 PM    BACTERIA None Seen 09/23/2023 07:20 PM    CLARITYU Clear 09/23/2023 07:20 PM    SPECGRAV 1.010 09/23/2023 07:20 PM    LEUKOCYTESUR Negative 09/23/2023 07:20 PM    UROBILINOGEN 0.2 09/23/2023 07:20 PM    BILIRUBINUR Negative 09/23/2023 07:20 PM    BILIRUBINUR neg 10/06/2021 05:05 PM    BLOODU SMALL 09/23/2023 07:20 PM    GLUCOSEU Negative 09/23/2023 07:20 PM    Kenny Galla 15 09/23/2023 07:20 PM     Urine Cultures:   Lab Results   Component Value Date/Time    LABURIN  10/06/2021 04:09 PM     <10,000 CFU/ml mixed skin/urogenital krystyna. No further workup     Blood Cultures:   Lab Results   Component Value Date/Time    BC No growth after 5 days of incubation. 05/27/2019 08:39 AM     Lab Results   Component Value Date/Time    BLOODCULT2 No growth after 5 days of incubation. 05/27/2019 09:20 AM     Organism: No results found for: \"ORG\"    Imaging    All imaging has been personally reviewed by me.    CT ABDOMEN PELVIS W IV CONTRAST Additional Contrast? None    Result Date: 9/23/2023  EXAMINATION: CT OF THE ABDOMEN AND PELVIS WITH CONTRAST 9/23/2023 10:30 pm TECHNIQUE: CT of the abdomen and pelvis was performed with the administration of intravenous contrast. Multiplanar reformatted images are
Time Out         Minutes           Timed Code Treatment Minutes:      Total Treatment Minutes:         Electronically Signed By: Cookie Fuller PT
role and benefits, discharge recommendations  Learning Assessment:  Patient verbalized and demonstrates understanding    Assessment  Activity Tolerance: Tolerated well  Impairments Requiring Therapeutic Intervention: none - eval with same day discharge  Prognosis: good without need for therapy intervention  Clinical Assessment: Patient presenting at independent level for completion of required self care tasks for return to home. Eval with d/c at this time. No therapy services indicated. Safety Interventions: patient left in bed, call light within reach, gait belt, nurse notified, patient up ad kilo , and family/caregiver present    Plan  Frequency: Eval with same day discharge. No follow up required. Current Treatment Recommendations: Not applicable, evaluation completed with same day discharge. Goals  Patient eval with same day discharge. No goals set as patient is at baseline self care status.       Therapy Session Time     Individual Group Co-treatment   Time In    1050   Time Out    1116   Minutes    26        Timed Code Treatment Minutes:   11  Total Treatment Minutes:  26       Electronically Signed By: REGINE Dailey MOT OTR/L [id number]

## 2023-09-25 NOTE — CARE COORDINATION
09/25/23 1254   IMM Letter   IMM Letter given to Patient/Family/Significant other/Guardian/POA/by: Given to Patient by .    IMM Letter date given: 09/25/23   IMM Letter time given: 1216     Electronically signed by JACEY De Guzman on 9/25/2023 at 12:54 PM Walking

## 2023-09-25 NOTE — PLAN OF CARE
Problem: Pain  Goal: Verbalizes/displays adequate comfort level or baseline comfort level  Outcome: Progressing  Flowsheets (Taken 9/25/2023 0509)  Verbalizes/displays adequate comfort level or baseline comfort level:   Encourage patient to monitor pain and request assistance   Assess pain using appropriate pain scale   Administer analgesics based on type and severity of pain and evaluate response   Implement non-pharmacological measures as appropriate and evaluate response     Problem: Skin/Tissue Integrity  Goal: Absence of new skin breakdown  Description: 1. Monitor for areas of redness and/or skin breakdown  2. Assess vascular access sites hourly  3. Every 4-6 hours minimum:  Change oxygen saturation probe site  4. Every 4-6 hours:  If on nasal continuous positive airway pressure, respiratory therapy assess nares and determine need for appliance change or resting period.   Outcome: Progressing     Problem: Safety - Adult  Goal: Free from fall injury  Outcome: Progressing

## 2023-09-26 ENCOUNTER — CARE COORDINATION (OUTPATIENT)
Dept: CASE MANAGEMENT | Age: 73
End: 2023-09-26

## 2023-09-26 DIAGNOSIS — R31.0 GROSS HEMATURIA: Primary | ICD-10-CM

## 2023-09-26 PROCEDURE — 1111F DSCHRG MED/CURRENT MED MERGE: CPT | Performed by: INTERNAL MEDICINE

## 2023-09-26 NOTE — CARE COORDINATION
[facility] Care Transitions Initial Follow Up Call    Call within 2 business days of discharge: Yes    First attempt at 24 hour discharge call, no answer, CTN left VM with contact information and request for return call. CTN will continue with outreach call attempts. Follow Up  No future appointments.     Yolette Ward RN

## 2023-09-26 NOTE — CARE COORDINATION
factors.   Plan for next call: symptom management-.  self management-.  follow-up appointment-.    Rickey Islas RN

## 2023-10-04 ENCOUNTER — CARE COORDINATION (OUTPATIENT)
Dept: CASE MANAGEMENT | Age: 73
End: 2023-10-04

## 2023-10-04 NOTE — CARE COORDINATION
you currently have any active services?: No  Do you have any needs or concerns that I can assist you with?: No  Identified Barriers: None  Care Transitions Interventions                          Other Interventions:             Care Transition Nurse provided contact information for future needs. Plan for follow-up call in 5-7 days based on severity of symptoms and risk factors.   Plan for next call: symptom management-.  self management-.  follow-up appointment-.    Vaughn Altamirano RN

## 2023-10-11 ENCOUNTER — CARE COORDINATION (OUTPATIENT)
Dept: CASE MANAGEMENT | Age: 73
End: 2023-10-11

## 2023-10-18 ENCOUNTER — CARE COORDINATION (OUTPATIENT)
Dept: CASE MANAGEMENT | Age: 73
End: 2023-10-18

## 2023-10-18 NOTE — CARE COORDINATION
Care Transitions Follow Up Call    Patient Current Location:  Home: [address] [place] [address] Transition Nurse contacted the patient by telephone. Verified name and  with patient as identifiers. Patient: Alejandro Lord  Patient : 1950   MRN: [id number]  Reason for Admission: RUQ pain, vomiting, hydronephrosis, hematuria, pulmonary nodule  Discharge Date: 23 RARS: Readmission Risk Score: 9.8      Needs to be reviewed by the provider   Additional needs identified to be addressed with provider: No  none             Method of communication with provider: none. Patient reports that she is doing very well, denies any bleeding, burning with urination, pain, fever. She is scheduled to follow up with PCP next week and denies any questions or concerns at this time. CTN will continue with outreach follow up calls. Patient missed her previously scheduled PCP appointment due to office relocation, she was unaware that they had moved. Follow Up  Future Appointments   Date Time Provider [address]   10/24/2023  4:40 PM DO WALE Pretty     External follow up appointment(s):     Care Transition Nurse reviewed red flags with patient and discussed any barriers to care and/or understanding of plan of care after discharge. Discussed appropriate site of care based on symptoms and resources available to patient including: PCP  Urgent care clinics  When to call 911. The patient agrees to contact the PCP office for questions related to their healthcare. Offered patient enrollment in the Remote Patient Monitoring (RPM) program for in-home monitoring:  N/A HTN is well-controlled .      Care Transitions Subsequent and Final Call    Subsequent and Final Calls  Do you have any ongoing symptoms?: No  Have your medications changed?: No  Do you have any questions related to your medications?: No  Do you currently have any active services?: No  Do

## 2023-10-24 ENCOUNTER — OFFICE VISIT (OUTPATIENT)
Dept: INTERNAL MEDICINE CLINIC | Age: 73
End: 2023-10-24
Payer: MEDICARE

## 2023-10-24 VITALS
DIASTOLIC BLOOD PRESSURE: 110 MMHG | HEIGHT: 65 IN | BODY MASS INDEX: 35.59 KG/M2 | HEART RATE: 78 BPM | SYSTOLIC BLOOD PRESSURE: 162 MMHG | OXYGEN SATURATION: 95 % | WEIGHT: 213.6 LBS

## 2023-10-24 DIAGNOSIS — N13.30 HYDRONEPHROSIS OF RIGHT KIDNEY: ICD-10-CM

## 2023-10-24 DIAGNOSIS — F43.9 STRESS AT HOME: ICD-10-CM

## 2023-10-24 DIAGNOSIS — N17.9 AKI (ACUTE KIDNEY INJURY) (HCC): ICD-10-CM

## 2023-10-24 DIAGNOSIS — C91.10 CLL (CHRONIC LYMPHOCYTIC LEUKEMIA) (HCC): ICD-10-CM

## 2023-10-24 DIAGNOSIS — I10 BENIGN ESSENTIAL HTN: ICD-10-CM

## 2023-10-24 DIAGNOSIS — E66.01 SEVERE OBESITY (BMI 35.0-39.9) WITH COMORBIDITY (HCC): ICD-10-CM

## 2023-10-24 DIAGNOSIS — I48.0 PAROXYSMAL A-FIB (HCC): ICD-10-CM

## 2023-10-24 DIAGNOSIS — N13.4 HYDROURETER ON RIGHT: ICD-10-CM

## 2023-10-24 DIAGNOSIS — Z09 HOSPITAL DISCHARGE FOLLOW-UP: Primary | ICD-10-CM

## 2023-10-24 DIAGNOSIS — Z76.89 ENCOUNTER FOR NAIL CARE: ICD-10-CM

## 2023-10-24 DIAGNOSIS — E66.9 OBESITY (BMI 35.0-39.9 WITHOUT COMORBIDITY): ICD-10-CM

## 2023-10-24 PROCEDURE — G8400 PT W/DXA NO RESULTS DOC: HCPCS | Performed by: INTERNAL MEDICINE

## 2023-10-24 PROCEDURE — 3078F DIAST BP <80 MM HG: CPT | Performed by: INTERNAL MEDICINE

## 2023-10-24 PROCEDURE — 1090F PRES/ABSN URINE INCON ASSESS: CPT | Performed by: INTERNAL MEDICINE

## 2023-10-24 PROCEDURE — 1123F ACP DISCUSS/DSCN MKR DOCD: CPT | Performed by: INTERNAL MEDICINE

## 2023-10-24 PROCEDURE — G8427 DOCREV CUR MEDS BY ELIG CLIN: HCPCS | Performed by: INTERNAL MEDICINE

## 2023-10-24 PROCEDURE — 1111F DSCHRG MED/CURRENT MED MERGE: CPT | Performed by: INTERNAL MEDICINE

## 2023-10-24 PROCEDURE — G8484 FLU IMMUNIZE NO ADMIN: HCPCS | Performed by: INTERNAL MEDICINE

## 2023-10-24 PROCEDURE — 1036F TOBACCO NON-USER: CPT | Performed by: INTERNAL MEDICINE

## 2023-10-24 PROCEDURE — 3017F COLORECTAL CA SCREEN DOC REV: CPT | Performed by: INTERNAL MEDICINE

## 2023-10-24 PROCEDURE — 3074F SYST BP LT 130 MM HG: CPT | Performed by: INTERNAL MEDICINE

## 2023-10-24 PROCEDURE — 99215 OFFICE O/P EST HI 40 MIN: CPT | Performed by: INTERNAL MEDICINE

## 2023-10-24 PROCEDURE — G8417 CALC BMI ABV UP PARAM F/U: HCPCS | Performed by: INTERNAL MEDICINE

## 2023-10-24 SDOH — ECONOMIC STABILITY: FOOD INSECURITY: WITHIN THE PAST 12 MONTHS, YOU WORRIED THAT YOUR FOOD WOULD RUN OUT BEFORE YOU GOT MONEY TO BUY MORE.: NEVER TRUE

## 2023-10-24 SDOH — ECONOMIC STABILITY: HOUSING INSECURITY
IN THE LAST 12 MONTHS, WAS THERE A TIME WHEN YOU DID NOT HAVE A STEADY PLACE TO SLEEP OR SLEPT IN A SHELTER (INCLUDING NOW)?: NO

## 2023-10-24 SDOH — ECONOMIC STABILITY: INCOME INSECURITY: HOW HARD IS IT FOR YOU TO PAY FOR THE VERY BASICS LIKE FOOD, HOUSING, MEDICAL CARE, AND HEATING?: NOT HARD AT ALL

## 2023-10-24 SDOH — ECONOMIC STABILITY: FOOD INSECURITY: WITHIN THE PAST 12 MONTHS, THE FOOD YOU BOUGHT JUST DIDN'T LAST AND YOU DIDN'T HAVE MONEY TO GET MORE.: NEVER TRUE

## 2023-10-24 ASSESSMENT — PATIENT HEALTH QUESTIONNAIRE - PHQ9
1. LITTLE INTEREST OR PLEASURE IN DOING THINGS: 0
SUM OF ALL RESPONSES TO PHQ QUESTIONS 1-9: 0
SUM OF ALL RESPONSES TO PHQ QUESTIONS 1-9: 0
2. FEELING DOWN, DEPRESSED OR HOPELESS: 0
SUM OF ALL RESPONSES TO PHQ9 QUESTIONS 1 & 2: 0
SUM OF ALL RESPONSES TO PHQ QUESTIONS 1-9: 0
SUM OF ALL RESPONSES TO PHQ QUESTIONS 1-9: 0

## 2023-10-24 NOTE — PATIENT INSTRUCTIONS
Please call Dr. Kat Driver office to make an appointment:  Martina Salgado MD, [facility], [facility]   Office: [phone]  Fax: [phone]     Ask your oncologist if you should see urology and have a  cystoscopy done due to your recent hospitalization with hydronephrosis, hydroureter, and thickening of bladder wall on CT, presumptively following passing of kidney stone from right kidney.

## 2023-10-25 ENCOUNTER — CARE COORDINATION (OUTPATIENT)
Dept: CASE MANAGEMENT | Age: 73
End: 2023-10-25

## 2023-10-25 DIAGNOSIS — N17.9 AKI (ACUTE KIDNEY INJURY) (HCC): ICD-10-CM

## 2023-10-25 LAB
ALBUMIN SERPL-MCNC: 4.2 G/DL (ref 3.4–5)
ANION GAP SERPL CALCULATED.3IONS-SCNC: 10 MMOL/L (ref 3–16)
BUN SERPL-MCNC: 35 MG/DL (ref 7–20)
CALCIUM SERPL-MCNC: 9.1 MG/DL (ref 8.3–10.6)
CHLORIDE SERPL-SCNC: 108 MMOL/L (ref 99–110)
CO2 SERPL-SCNC: 26 MMOL/L (ref 21–32)
CREAT SERPL-MCNC: 1.1 MG/DL (ref 0.6–1.2)
GFR SERPLBLD CREATININE-BSD FMLA CKD-EPI: 52 ML/MIN/{1.73_M2}
GLUCOSE SERPL-MCNC: 96 MG/DL (ref 70–99)
PHOSPHATE SERPL-MCNC: 3.3 MG/DL (ref 2.5–4.9)
POTASSIUM SERPL-SCNC: 4.7 MMOL/L (ref 3.5–5.1)
SODIUM SERPL-SCNC: 144 MMOL/L (ref 136–145)

## 2023-10-25 NOTE — CARE COORDINATION
Final follow up outreach call attempt, no answer. CTN left  with contact information and request for return call. CTN will close program & remain available.

## 2023-11-05 PROBLEM — N17.9 AKI (ACUTE KIDNEY INJURY) (HCC): Status: ACTIVE | Noted: 2023-11-05

## 2023-11-05 PROBLEM — F43.9 STRESS AT HOME: Status: ACTIVE | Noted: 2023-11-05

## 2023-11-05 PROBLEM — Z76.89 ENCOUNTER FOR NAIL CARE: Status: ACTIVE | Noted: 2019-05-30

## 2023-11-05 PROBLEM — I47.19 ATRIAL TACHYCARDIA: Status: RESOLVED | Noted: 2020-03-30 | Resolved: 2023-11-05

## 2023-11-05 PROBLEM — N13.4 HYDROURETER ON RIGHT: Status: ACTIVE | Noted: 2023-11-05

## 2023-11-05 PROBLEM — R11.2 NAUSEA & VOMITING: Status: RESOLVED | Noted: 2023-09-24 | Resolved: 2023-11-05

## 2023-11-05 PROBLEM — N13.30 HYDRONEPHROSIS OF RIGHT KIDNEY: Status: ACTIVE | Noted: 2023-11-05

## 2023-11-05 PROBLEM — R31.0 GROSS HEMATURIA: Status: RESOLVED | Noted: 2021-10-17 | Resolved: 2023-11-05

## 2023-11-05 PROBLEM — Z09 HOSPITAL DISCHARGE FOLLOW-UP: Status: ACTIVE | Noted: 2023-11-05

## 2023-11-05 PROBLEM — E66.01 SEVERE OBESITY (BMI 35.0-39.9) WITH COMORBIDITY (HCC): Status: ACTIVE | Noted: 2023-11-05

## 2023-11-05 ASSESSMENT — ENCOUNTER SYMPTOMS
DIARRHEA: 0
SHORTNESS OF BREATH: 0
CONSTIPATION: 0
CHEST TIGHTNESS: 0

## 2023-12-05 ENCOUNTER — OFFICE VISIT (OUTPATIENT)
Dept: INTERNAL MEDICINE CLINIC | Age: 73
End: 2023-12-05

## 2023-12-05 VITALS
OXYGEN SATURATION: 98 % | WEIGHT: 214 LBS | HEART RATE: 78 BPM | DIASTOLIC BLOOD PRESSURE: 82 MMHG | SYSTOLIC BLOOD PRESSURE: 128 MMHG | BODY MASS INDEX: 36.17 KG/M2

## 2023-12-05 VITALS
BODY MASS INDEX: 36.17 KG/M2 | SYSTOLIC BLOOD PRESSURE: 128 MMHG | HEART RATE: 78 BPM | DIASTOLIC BLOOD PRESSURE: 82 MMHG | OXYGEN SATURATION: 98 % | WEIGHT: 214 LBS

## 2023-12-05 DIAGNOSIS — Z87.891 PERSONAL HISTORY OF TOBACCO USE: ICD-10-CM

## 2023-12-05 DIAGNOSIS — I10 BENIGN ESSENTIAL HTN: ICD-10-CM

## 2023-12-05 DIAGNOSIS — F43.9 STRESS AT HOME: ICD-10-CM

## 2023-12-05 DIAGNOSIS — Z00.00 MEDICARE ANNUAL WELLNESS VISIT, SUBSEQUENT: Primary | ICD-10-CM

## 2023-12-05 DIAGNOSIS — N17.9 AKI (ACUTE KIDNEY INJURY) (HCC): ICD-10-CM

## 2023-12-05 DIAGNOSIS — N13.30 HYDRONEPHROSIS OF RIGHT KIDNEY: ICD-10-CM

## 2023-12-05 DIAGNOSIS — I10 BENIGN ESSENTIAL HTN: Primary | ICD-10-CM

## 2023-12-05 PROBLEM — Z09 HOSPITAL DISCHARGE FOLLOW-UP: Status: RESOLVED | Noted: 2023-11-05 | Resolved: 2023-12-05

## 2023-12-05 LAB
ALBUMIN SERPL-MCNC: 4.3 G/DL (ref 3.4–5)
ANION GAP SERPL CALCULATED.3IONS-SCNC: 13 MMOL/L (ref 3–16)
BUN SERPL-MCNC: 35 MG/DL (ref 7–20)
CALCIUM SERPL-MCNC: 9.2 MG/DL (ref 8.3–10.6)
CHLORIDE SERPL-SCNC: 105 MMOL/L (ref 99–110)
CO2 SERPL-SCNC: 22 MMOL/L (ref 21–32)
CREAT SERPL-MCNC: 1.1 MG/DL (ref 0.6–1.2)
GFR SERPLBLD CREATININE-BSD FMLA CKD-EPI: 52 ML/MIN/{1.73_M2}
GLUCOSE SERPL-MCNC: 83 MG/DL (ref 70–99)
PHOSPHATE SERPL-MCNC: 3.8 MG/DL (ref 2.5–4.9)
POTASSIUM SERPL-SCNC: 4.6 MMOL/L (ref 3.5–5.1)
SODIUM SERPL-SCNC: 140 MMOL/L (ref 136–145)

## 2023-12-05 ASSESSMENT — PATIENT HEALTH QUESTIONNAIRE - PHQ9
1. LITTLE INTEREST OR PLEASURE IN DOING THINGS: 0
SUM OF ALL RESPONSES TO PHQ QUESTIONS 1-9: 0
2. FEELING DOWN, DEPRESSED OR HOPELESS: 0
SUM OF ALL RESPONSES TO PHQ9 QUESTIONS 1 & 2: 0
SUM OF ALL RESPONSES TO PHQ QUESTIONS 1-9: 0

## 2024-09-08 PROBLEM — F43.9 STRESS AT HOME: Status: RESOLVED | Noted: 2023-11-05 | Resolved: 2024-09-08

## 2024-09-08 PROBLEM — Z76.89 ENCOUNTER FOR NAIL CARE: Status: RESOLVED | Noted: 2019-05-30 | Resolved: 2024-09-08

## 2024-09-08 PROBLEM — N17.9 AKI (ACUTE KIDNEY INJURY) (HCC): Status: RESOLVED | Noted: 2023-11-05 | Resolved: 2024-09-08

## 2024-09-11 ENCOUNTER — OFFICE VISIT (OUTPATIENT)
Dept: INTERNAL MEDICINE CLINIC | Age: 74
End: 2024-09-11

## 2024-09-11 VITALS
WEIGHT: 218.9 LBS | HEART RATE: 72 BPM | OXYGEN SATURATION: 96 % | SYSTOLIC BLOOD PRESSURE: 130 MMHG | DIASTOLIC BLOOD PRESSURE: 88 MMHG | BODY MASS INDEX: 36.99 KG/M2

## 2024-09-11 DIAGNOSIS — C91.10 CLL (CHRONIC LYMPHOCYTIC LEUKEMIA) (HCC): ICD-10-CM

## 2024-09-11 DIAGNOSIS — E78.5 HYPERLIPIDEMIA LDL GOAL <100: ICD-10-CM

## 2024-09-11 DIAGNOSIS — I10 BENIGN ESSENTIAL HTN: Primary | ICD-10-CM

## 2024-09-11 DIAGNOSIS — H61.21 CERUMEN DEBRIS ON TYMPANIC MEMBRANE OF RIGHT EAR: ICD-10-CM

## 2024-09-11 DIAGNOSIS — H61.21 IMPACTED CERUMEN OF RIGHT EAR: ICD-10-CM

## 2024-09-11 DIAGNOSIS — Z00.00 MEDICARE ANNUAL WELLNESS VISIT, SUBSEQUENT: ICD-10-CM

## 2024-09-11 DIAGNOSIS — Z12.11 SCREENING FOR COLON CANCER: Primary | ICD-10-CM

## 2024-09-11 DIAGNOSIS — Z78.0 POST-MENOPAUSE: ICD-10-CM

## 2024-09-11 DIAGNOSIS — E55.9 VITAMIN D INSUFFICIENCY: ICD-10-CM

## 2024-09-11 DIAGNOSIS — Z87.891 PERSONAL HISTORY OF TOBACCO USE: ICD-10-CM

## 2024-09-11 DIAGNOSIS — E66.01 SEVERE OBESITY (BMI 35.0-39.9) WITH COMORBIDITY (HCC): ICD-10-CM

## 2024-09-11 DIAGNOSIS — I48.0 PAROXYSMAL A-FIB (HCC): ICD-10-CM

## 2024-09-11 RX ORDER — HYDROCHLOROTHIAZIDE 25 MG/1
25 TABLET ORAL EVERY MORNING
Qty: 90 TABLET | Refills: 1 | Status: SHIPPED | OUTPATIENT
Start: 2024-09-11

## 2024-09-11 ASSESSMENT — PATIENT HEALTH QUESTIONNAIRE - PHQ9
2. FEELING DOWN, DEPRESSED OR HOPELESS: NOT AT ALL
SUM OF ALL RESPONSES TO PHQ QUESTIONS 1-9: 0
SUM OF ALL RESPONSES TO PHQ9 QUESTIONS 1 & 2: 0
1. LITTLE INTEREST OR PLEASURE IN DOING THINGS: NOT AT ALL

## 2025-04-23 ENCOUNTER — TELEPHONE (OUTPATIENT)
Dept: INTERNAL MEDICINE CLINIC | Age: 75
End: 2025-04-23

## 2025-06-27 ENCOUNTER — TELEPHONE (OUTPATIENT)
Dept: INTERNAL MEDICINE CLINIC | Age: 75
End: 2025-06-27